# Patient Record
Sex: MALE | Race: ASIAN | Employment: FULL TIME | URBAN - METROPOLITAN AREA
[De-identification: names, ages, dates, MRNs, and addresses within clinical notes are randomized per-mention and may not be internally consistent; named-entity substitution may affect disease eponyms.]

---

## 2021-02-25 PROBLEM — E11.65 UNCONTROLLED TYPE 2 DIABETES MELLITUS WITH HYPERGLYCEMIA (HCC): Status: ACTIVE | Noted: 2021-02-25

## 2021-02-25 PROBLEM — R03.0 ELEVATED BP WITHOUT DIAGNOSIS OF HYPERTENSION: Status: ACTIVE | Noted: 2021-02-25

## 2021-02-25 PROBLEM — R79.89 LOW TESTOSTERONE IN MALE: Status: ACTIVE | Noted: 2021-02-25

## 2021-02-25 PROBLEM — R06.83 SNORING: Status: ACTIVE | Noted: 2021-02-25

## 2021-02-25 PROBLEM — E66.01 OBESITY, MORBID (HCC): Status: ACTIVE | Noted: 2021-02-25

## 2021-04-08 PROBLEM — E78.2 MIXED HYPERLIPIDEMIA DUE TO TYPE 2 DIABETES MELLITUS  (HCC): Status: ACTIVE | Noted: 2021-04-08

## 2021-04-08 PROBLEM — E11.69 MIXED HYPERLIPIDEMIA DUE TO TYPE 2 DIABETES MELLITUS  (HCC): Status: ACTIVE | Noted: 2021-04-08

## 2021-04-08 PROBLEM — D72.829 LEUKOCYTOSIS: Status: ACTIVE | Noted: 2021-04-08

## 2021-04-08 PROBLEM — E78.2 MIXED HYPERLIPIDEMIA DUE TO TYPE 2 DIABETES MELLITUS: Status: ACTIVE | Noted: 2021-04-08

## 2021-04-08 PROBLEM — E11.69 MIXED HYPERLIPIDEMIA DUE TO TYPE 2 DIABETES MELLITUS: Status: ACTIVE | Noted: 2021-04-08

## 2021-04-08 PROBLEM — R79.89 ABNORMAL LFTS: Status: ACTIVE | Noted: 2021-04-08

## 2021-04-08 PROBLEM — I10 ESSENTIAL HYPERTENSION: Status: ACTIVE | Noted: 2021-04-08

## 2021-04-08 PROBLEM — E11.69 MIXED HYPERLIPIDEMIA DUE TO TYPE 2 DIABETES MELLITUS (HCC): Status: ACTIVE | Noted: 2021-04-08

## 2021-04-08 PROBLEM — E78.2 MIXED HYPERLIPIDEMIA DUE TO TYPE 2 DIABETES MELLITUS (HCC): Status: ACTIVE | Noted: 2021-04-08

## 2021-11-09 PROBLEM — F10.20 ALCOHOLIC (HCC): Status: ACTIVE | Noted: 2021-11-09

## 2022-08-04 ENCOUNTER — OFFICE VISIT (OUTPATIENT)
Dept: FAMILY MEDICINE CLINIC | Facility: CLINIC | Age: 32
End: 2022-08-04
Payer: COMMERCIAL

## 2022-08-04 VITALS
HEART RATE: 97 BPM | TEMPERATURE: 98 F | WEIGHT: 261.38 LBS | DIASTOLIC BLOOD PRESSURE: 114 MMHG | HEIGHT: 67.91 IN | SYSTOLIC BLOOD PRESSURE: 144 MMHG | BODY MASS INDEX: 40.08 KG/M2 | OXYGEN SATURATION: 99 %

## 2022-08-04 DIAGNOSIS — E78.2 MIXED HYPERLIPIDEMIA DUE TO TYPE 2 DIABETES MELLITUS (HCC): ICD-10-CM

## 2022-08-04 DIAGNOSIS — Z13.29 SCREENING FOR THYROID DISORDER: ICD-10-CM

## 2022-08-04 DIAGNOSIS — Z11.59 NEED FOR HEPATITIS C SCREENING TEST: ICD-10-CM

## 2022-08-04 DIAGNOSIS — Z13.0 SCREENING, ANEMIA, DEFICIENCY, IRON: ICD-10-CM

## 2022-08-04 DIAGNOSIS — Z00.00 WELLNESS EXAMINATION: Primary | ICD-10-CM

## 2022-08-04 DIAGNOSIS — E11.69 MIXED HYPERLIPIDEMIA DUE TO TYPE 2 DIABETES MELLITUS (HCC): ICD-10-CM

## 2022-08-04 DIAGNOSIS — E11.65 UNCONTROLLED TYPE 2 DIABETES MELLITUS WITH HYPERGLYCEMIA (HCC): ICD-10-CM

## 2022-08-04 DIAGNOSIS — R80.9 MICROALBUMINURIA: ICD-10-CM

## 2022-08-04 DIAGNOSIS — I10 ESSENTIAL HYPERTENSION: ICD-10-CM

## 2022-08-04 DIAGNOSIS — R79.89 ABNORMAL LFTS: ICD-10-CM

## 2022-08-04 LAB
CARTRIDGE EXPIRATION DATE: ABNORMAL DATE
CARTRIDGE LOT#: 978 NUMERIC
CREAT UR-SCNC: 177 MG/DL
HEMOGLOBIN A1C: 9.2 % (ref 4.3–5.6)
MICROALBUMIN UR-MCNC: 1.03 MG/DL
MICROALBUMIN/CREAT 24H UR-RTO: 5.8 UG/MG (ref ?–30)

## 2022-08-04 PROCEDURE — 3077F SYST BP >= 140 MM HG: CPT | Performed by: FAMILY MEDICINE

## 2022-08-04 PROCEDURE — 83036 HEMOGLOBIN GLYCOSYLATED A1C: CPT | Performed by: FAMILY MEDICINE

## 2022-08-04 PROCEDURE — 82043 UR ALBUMIN QUANTITATIVE: CPT | Performed by: FAMILY MEDICINE

## 2022-08-04 PROCEDURE — 82570 ASSAY OF URINE CREATININE: CPT | Performed by: FAMILY MEDICINE

## 2022-08-04 PROCEDURE — 3008F BODY MASS INDEX DOCD: CPT | Performed by: FAMILY MEDICINE

## 2022-08-04 PROCEDURE — 99204 OFFICE O/P NEW MOD 45 MIN: CPT | Performed by: FAMILY MEDICINE

## 2022-08-04 PROCEDURE — 3061F NEG MICROALBUMINURIA REV: CPT | Performed by: FAMILY MEDICINE

## 2022-08-04 PROCEDURE — 99385 PREV VISIT NEW AGE 18-39: CPT | Performed by: FAMILY MEDICINE

## 2022-08-04 PROCEDURE — 3046F HEMOGLOBIN A1C LEVEL >9.0%: CPT | Performed by: FAMILY MEDICINE

## 2022-08-04 PROCEDURE — 3080F DIAST BP >= 90 MM HG: CPT | Performed by: FAMILY MEDICINE

## 2022-08-04 RX ORDER — LOSARTAN POTASSIUM 50 MG/1
50 TABLET ORAL DAILY
Qty: 90 TABLET | Refills: 0 | Status: SHIPPED | OUTPATIENT
Start: 2022-08-04

## 2022-09-16 ENCOUNTER — TELEPHONE (OUTPATIENT)
Dept: FAMILY MEDICINE CLINIC | Facility: CLINIC | Age: 32
End: 2022-09-16

## 2022-09-16 DIAGNOSIS — Z20.2 STD EXPOSURE: Primary | ICD-10-CM

## 2022-09-16 DIAGNOSIS — Z11.3 SCREEN FOR STD (SEXUALLY TRANSMITTED DISEASE): ICD-10-CM

## 2022-09-16 NOTE — TELEPHONE ENCOUNTER
Please let the patient know I have ordered the STD panel which checks for syphilis, gonorrhea, chlamydia, HIV and Hepatitis A, B and C. He should be check for all these if there was a possible STD exposure.

## 2022-09-17 ENCOUNTER — LAB ENCOUNTER (OUTPATIENT)
Dept: LAB | Age: 32
End: 2022-09-17
Attending: FAMILY MEDICINE
Payer: COMMERCIAL

## 2022-09-17 DIAGNOSIS — Z20.2 STD EXPOSURE: ICD-10-CM

## 2022-09-17 DIAGNOSIS — Z11.3 SCREEN FOR STD (SEXUALLY TRANSMITTED DISEASE): ICD-10-CM

## 2022-09-17 LAB — T PALLIDUM AB SER QL IA: REACTIVE

## 2022-09-17 PROCEDURE — 86780 TREPONEMA PALLIDUM: CPT

## 2022-09-17 PROCEDURE — 86593 SYPHILIS TEST NON-TREP QUANT: CPT

## 2022-09-17 PROCEDURE — 86592 SYPHILIS TEST NON-TREP QUAL: CPT

## 2022-09-17 PROCEDURE — 36415 COLL VENOUS BLD VENIPUNCTURE: CPT

## 2022-09-18 LAB
RPR SER QL: REACTIVE
RPR SER-TITR: 16 {TITER}

## 2022-09-19 ENCOUNTER — TELEPHONE (OUTPATIENT)
Dept: FAMILY MEDICINE CLINIC | Facility: CLINIC | Age: 32
End: 2022-09-19

## 2022-09-20 ENCOUNTER — PATIENT MESSAGE (OUTPATIENT)
Dept: FAMILY MEDICINE CLINIC | Facility: CLINIC | Age: 32
End: 2022-09-20

## 2022-09-21 NOTE — TELEPHONE ENCOUNTER
From: Petar Ellis  To: Gumaro Monahan DO  Sent: 9/20/2022 12:32 PM CDT  Subject: Syphillis    Hi, I was wondering what treatment will it be for my positive test result of syphillis. And where do I have to get it done? And I did make a video appointment with you for this Thursday at 11:30am to further discuss anything you wanted to ask or tell me.   Thank you,  Petar Vera

## 2022-09-22 ENCOUNTER — TELEMEDICINE (OUTPATIENT)
Dept: FAMILY MEDICINE CLINIC | Facility: CLINIC | Age: 32
End: 2022-09-22

## 2022-09-22 ENCOUNTER — LAB ENCOUNTER (OUTPATIENT)
Dept: LAB | Age: 32
End: 2022-09-22
Attending: FAMILY MEDICINE

## 2022-09-22 VITALS — HEIGHT: 67.91 IN | WEIGHT: 264 LBS | BODY MASS INDEX: 40.48 KG/M2

## 2022-09-22 DIAGNOSIS — A53.0 LATENT SYPHILIS IN MALE: Primary | ICD-10-CM

## 2022-09-22 DIAGNOSIS — I10 ESSENTIAL HYPERTENSION: ICD-10-CM

## 2022-09-22 DIAGNOSIS — Z13.29 SCREENING FOR THYROID DISORDER: ICD-10-CM

## 2022-09-22 DIAGNOSIS — Z20.2 STD EXPOSURE: ICD-10-CM

## 2022-09-22 DIAGNOSIS — I10 PRIMARY HYPERTENSION: ICD-10-CM

## 2022-09-22 DIAGNOSIS — E11.65 UNCONTROLLED TYPE 2 DIABETES MELLITUS WITH HYPERGLYCEMIA (HCC): ICD-10-CM

## 2022-09-22 DIAGNOSIS — Z13.0 SCREENING, ANEMIA, DEFICIENCY, IRON: ICD-10-CM

## 2022-09-22 DIAGNOSIS — Z11.59 NEED FOR HEPATITIS C SCREENING TEST: ICD-10-CM

## 2022-09-22 DIAGNOSIS — Z11.3 SCREEN FOR STD (SEXUALLY TRANSMITTED DISEASE): ICD-10-CM

## 2022-09-22 LAB
BASOPHILS # BLD AUTO: 0.07 X10(3) UL (ref 0–0.2)
BASOPHILS NFR BLD AUTO: 0.7 %
EOSINOPHIL # BLD AUTO: 0.34 X10(3) UL (ref 0–0.7)
EOSINOPHIL NFR BLD AUTO: 3.2 %
ERYTHROCYTE [DISTWIDTH] IN BLOOD BY AUTOMATED COUNT: 15.9 %
HAV IGM SER QL: NONREACTIVE
HBV CORE IGM SER QL: NONREACTIVE
HBV SURFACE AG SERPL QL IA: NONREACTIVE
HCT VFR BLD AUTO: 43.8 %
HCV AB SERPL QL IA: NONREACTIVE
HGB BLD-MCNC: 13.7 G/DL
IMM GRANULOCYTES # BLD AUTO: 0.03 X10(3) UL (ref 0–1)
IMM GRANULOCYTES NFR BLD: 0.3 %
LYMPHOCYTES # BLD AUTO: 3.14 X10(3) UL (ref 1–4)
LYMPHOCYTES NFR BLD AUTO: 29.9 %
MCH RBC QN AUTO: 28.5 PG (ref 26–34)
MCHC RBC AUTO-ENTMCNC: 31.3 G/DL (ref 31–37)
MCV RBC AUTO: 91.1 FL
MONOCYTES # BLD AUTO: 0.75 X10(3) UL (ref 0.1–1)
MONOCYTES NFR BLD AUTO: 7.1 %
NEUTROPHILS # BLD AUTO: 6.18 X10 (3) UL (ref 1.5–7.7)
NEUTROPHILS # BLD AUTO: 6.18 X10(3) UL (ref 1.5–7.7)
NEUTROPHILS NFR BLD AUTO: 58.8 %
PLATELET # BLD AUTO: 217 10(3)UL (ref 150–450)
RBC # BLD AUTO: 4.81 X10(6)UL
TSI SER-ACNC: 1.63 MIU/ML (ref 0.36–3.74)
WBC # BLD AUTO: 10.5 X10(3) UL (ref 4–11)

## 2022-09-22 PROCEDURE — 87491 CHLMYD TRACH DNA AMP PROBE: CPT

## 2022-09-22 PROCEDURE — 80074 ACUTE HEPATITIS PANEL: CPT

## 2022-09-22 PROCEDURE — 36415 COLL VENOUS BLD VENIPUNCTURE: CPT

## 2022-09-22 PROCEDURE — 80053 COMPREHEN METABOLIC PANEL: CPT

## 2022-09-22 PROCEDURE — 87591 N.GONORRHOEAE DNA AMP PROB: CPT

## 2022-09-22 PROCEDURE — 84443 ASSAY THYROID STIM HORMONE: CPT

## 2022-09-22 PROCEDURE — 85025 COMPLETE CBC W/AUTO DIFF WBC: CPT

## 2022-09-22 PROCEDURE — 99443 PHONE E/M BY PHYS 21-30 MIN: CPT | Performed by: FAMILY MEDICINE

## 2022-09-22 PROCEDURE — 87389 HIV-1 AG W/HIV-1&-2 AB AG IA: CPT

## 2022-09-22 PROCEDURE — 80061 LIPID PANEL: CPT

## 2022-09-23 DIAGNOSIS — I10 PRIMARY HYPERTENSION: Primary | ICD-10-CM

## 2022-09-23 LAB
ALBUMIN SERPL-MCNC: 3.8 G/DL (ref 3.4–5)
ALBUMIN/GLOB SERPL: 0.8 {RATIO} (ref 1–2)
ALP LIVER SERPL-CCNC: 121 U/L
ALT SERPL-CCNC: 81 U/L
ANION GAP SERPL CALC-SCNC: 17 MMOL/L (ref 0–18)
AST SERPL-CCNC: 55 U/L (ref 15–37)
BILIRUB SERPL-MCNC: 0.6 MG/DL (ref 0.1–2)
BUN BLD-MCNC: 7 MG/DL (ref 7–18)
C TRACH DNA SPEC QL NAA+PROBE: NEGATIVE
CALCIUM BLD-MCNC: 9.3 MG/DL (ref 8.5–10.1)
CHLORIDE SERPL-SCNC: 100 MMOL/L (ref 98–112)
CHOLEST SERPL-MCNC: 200 MG/DL (ref ?–200)
CO2 SERPL-SCNC: 20 MMOL/L (ref 21–32)
CREAT BLD-MCNC: 0.8 MG/DL
GFR SERPLBLD BASED ON 1.73 SQ M-ARVRAT: 122 ML/MIN/1.73M2 (ref 60–?)
GLOBULIN PLAS-MCNC: 4.8 G/DL (ref 2.8–4.4)
GLUCOSE BLD-MCNC: 224 MG/DL (ref 70–99)
HDLC SERPL-MCNC: 44 MG/DL (ref 40–59)
LDLC SERPL CALC-MCNC: 110 MG/DL (ref ?–100)
N GONORRHOEA DNA SPEC QL NAA+PROBE: NEGATIVE
NONHDLC SERPL-MCNC: 156 MG/DL (ref ?–130)
OSMOLALITY SERPL CALC.SUM OF ELEC: 289 MOSM/KG (ref 275–295)
POTASSIUM SERPL-SCNC: 3.5 MMOL/L (ref 3.5–5.1)
PROT SERPL-MCNC: 8.6 G/DL (ref 6.4–8.2)
SODIUM SERPL-SCNC: 137 MMOL/L (ref 136–145)
TRIGL SERPL-MCNC: 269 MG/DL (ref 30–149)
VLDLC SERPL CALC-MCNC: 47 MG/DL (ref 0–30)

## 2022-10-31 ENCOUNTER — LAB ENCOUNTER (OUTPATIENT)
Dept: LAB | Age: 32
End: 2022-10-31
Attending: FAMILY MEDICINE
Payer: COMMERCIAL

## 2022-10-31 DIAGNOSIS — E11.65 UNCONTROLLED TYPE 2 DIABETES MELLITUS WITH HYPERGLYCEMIA (HCC): ICD-10-CM

## 2022-10-31 DIAGNOSIS — E78.2 MIXED HYPERLIPIDEMIA DUE TO TYPE 2 DIABETES MELLITUS (HCC): ICD-10-CM

## 2022-10-31 DIAGNOSIS — E11.69 MIXED HYPERLIPIDEMIA DUE TO TYPE 2 DIABETES MELLITUS (HCC): ICD-10-CM

## 2022-10-31 LAB
ALBUMIN SERPL-MCNC: 3.9 G/DL (ref 3.4–5)
ALBUMIN/GLOB SERPL: 0.8 {RATIO} (ref 1–2)
ALP LIVER SERPL-CCNC: 110 U/L
ALT SERPL-CCNC: 130 U/L
ANION GAP SERPL CALC-SCNC: 8 MMOL/L (ref 0–18)
AST SERPL-CCNC: 109 U/L (ref 15–37)
BILIRUB SERPL-MCNC: 1.1 MG/DL (ref 0.1–2)
BUN BLD-MCNC: 8 MG/DL (ref 7–18)
CALCIUM BLD-MCNC: 9.6 MG/DL (ref 8.5–10.1)
CHLORIDE SERPL-SCNC: 101 MMOL/L (ref 98–112)
CHOLEST SERPL-MCNC: 219 MG/DL (ref ?–200)
CO2 SERPL-SCNC: 27 MMOL/L (ref 21–32)
CREAT BLD-MCNC: 0.81 MG/DL
FASTING PATIENT LIPID ANSWER: YES
FASTING STATUS PATIENT QL REPORTED: YES
GFR SERPLBLD BASED ON 1.73 SQ M-ARVRAT: 122 ML/MIN/1.73M2 (ref 60–?)
GLOBULIN PLAS-MCNC: 4.7 G/DL (ref 2.8–4.4)
GLUCOSE BLD-MCNC: 164 MG/DL (ref 70–99)
HDLC SERPL-MCNC: 47 MG/DL (ref 40–59)
LDLC SERPL CALC-MCNC: 129 MG/DL (ref ?–100)
NONHDLC SERPL-MCNC: 172 MG/DL (ref ?–130)
OSMOLALITY SERPL CALC.SUM OF ELEC: 284 MOSM/KG (ref 275–295)
POTASSIUM SERPL-SCNC: 4 MMOL/L (ref 3.5–5.1)
PROT SERPL-MCNC: 8.6 G/DL (ref 6.4–8.2)
SODIUM SERPL-SCNC: 136 MMOL/L (ref 136–145)
TRIGL SERPL-MCNC: 240 MG/DL (ref 30–149)
VLDLC SERPL CALC-MCNC: 44 MG/DL (ref 0–30)

## 2022-10-31 PROCEDURE — 80053 COMPREHEN METABOLIC PANEL: CPT

## 2022-10-31 PROCEDURE — 80061 LIPID PANEL: CPT

## 2022-10-31 PROCEDURE — 36415 COLL VENOUS BLD VENIPUNCTURE: CPT

## 2022-11-02 ENCOUNTER — OFFICE VISIT (OUTPATIENT)
Dept: FAMILY MEDICINE CLINIC | Facility: CLINIC | Age: 32
End: 2022-11-02
Payer: COMMERCIAL

## 2022-11-02 VITALS
BODY MASS INDEX: 41.25 KG/M2 | WEIGHT: 269 LBS | DIASTOLIC BLOOD PRESSURE: 82 MMHG | OXYGEN SATURATION: 98 % | HEART RATE: 93 BPM | HEIGHT: 67.91 IN | SYSTOLIC BLOOD PRESSURE: 138 MMHG | TEMPERATURE: 97 F | RESPIRATION RATE: 16 BRPM

## 2022-11-02 DIAGNOSIS — R16.0 HEPATOMEGALY: ICD-10-CM

## 2022-11-02 DIAGNOSIS — I10 ESSENTIAL HYPERTENSION: ICD-10-CM

## 2022-11-02 DIAGNOSIS — R80.9 MICROALBUMINURIA: ICD-10-CM

## 2022-11-02 DIAGNOSIS — E11.69 MIXED HYPERLIPIDEMIA DUE TO TYPE 2 DIABETES MELLITUS (HCC): ICD-10-CM

## 2022-11-02 DIAGNOSIS — A53.0 POSITIVE RPR TEST: ICD-10-CM

## 2022-11-02 DIAGNOSIS — E11.65 UNCONTROLLED TYPE 2 DIABETES MELLITUS WITH HYPERGLYCEMIA (HCC): Primary | ICD-10-CM

## 2022-11-02 DIAGNOSIS — E78.2 MIXED HYPERLIPIDEMIA DUE TO TYPE 2 DIABETES MELLITUS (HCC): ICD-10-CM

## 2022-11-02 DIAGNOSIS — R79.89 ELEVATED LFTS: ICD-10-CM

## 2022-11-02 DIAGNOSIS — Z23 NEED FOR INFLUENZA VACCINATION: ICD-10-CM

## 2022-11-02 LAB
CARTRIDGE LOT#: ABNORMAL NUMERIC
HEMOGLOBIN A1C: 7.3 % (ref 4.3–5.6)

## 2022-11-02 PROCEDURE — 90686 IIV4 VACC NO PRSV 0.5 ML IM: CPT | Performed by: FAMILY MEDICINE

## 2022-11-02 PROCEDURE — 3008F BODY MASS INDEX DOCD: CPT | Performed by: FAMILY MEDICINE

## 2022-11-02 PROCEDURE — 90471 IMMUNIZATION ADMIN: CPT | Performed by: FAMILY MEDICINE

## 2022-11-02 PROCEDURE — 83036 HEMOGLOBIN GLYCOSYLATED A1C: CPT | Performed by: FAMILY MEDICINE

## 2022-11-02 PROCEDURE — 3051F HG A1C>EQUAL 7.0%<8.0%: CPT | Performed by: FAMILY MEDICINE

## 2022-11-02 PROCEDURE — 3079F DIAST BP 80-89 MM HG: CPT | Performed by: FAMILY MEDICINE

## 2022-11-02 PROCEDURE — 99214 OFFICE O/P EST MOD 30 MIN: CPT | Performed by: FAMILY MEDICINE

## 2022-11-02 PROCEDURE — 3075F SYST BP GE 130 - 139MM HG: CPT | Performed by: FAMILY MEDICINE

## 2022-11-02 RX ORDER — ATORVASTATIN CALCIUM 20 MG/1
20 TABLET, FILM COATED ORAL NIGHTLY
Qty: 90 TABLET | Refills: 0 | Status: SHIPPED | OUTPATIENT
Start: 2022-11-02

## 2022-11-02 RX ORDER — LOSARTAN POTASSIUM 50 MG/1
50 TABLET ORAL DAILY
Qty: 90 TABLET | Refills: 0 | Status: SHIPPED | OUTPATIENT
Start: 2022-11-02

## 2022-11-02 NOTE — PATIENT INSTRUCTIONS
Start Atorvastatin 20 mg one tablet nightly for your cholesterol. Continue your metformin and Losartan as prescribed. Make an appointment with the gastroenterologist, Dr. Gilford Ferrier or one of his partners for further evaluation of your elevated liver function tests. Make an appointment with the eye doctor, Dr. Farhana Novoa, for your diabetic eye exam.    Increase your exercise and watch your diet. Limit fatty foods and carbohydrates. You received the Flu vaccines today. You may run a low grade fever, have mild redness or swelling at the site of the shot, muscle pain at the site of the shot for the next 2-3 days. You may take Tylenol or Ibuprofen as needed. Use your arm to help decrease pain and swelling. You can apply ice to any swelling for 10-15 minutes twice daily through clothing or a towel. See me in 3 months.

## 2022-11-28 DIAGNOSIS — E11.65 UNCONTROLLED TYPE 2 DIABETES MELLITUS WITH HYPERGLYCEMIA (HCC): ICD-10-CM

## 2022-11-28 DIAGNOSIS — R80.9 MICROALBUMINURIA: ICD-10-CM

## 2022-11-28 DIAGNOSIS — I10 ESSENTIAL HYPERTENSION: ICD-10-CM

## 2022-11-29 RX ORDER — LOSARTAN POTASSIUM 50 MG/1
50 TABLET ORAL DAILY
Qty: 90 TABLET | Refills: 0 | OUTPATIENT
Start: 2022-11-29

## 2023-01-02 DIAGNOSIS — E78.2 MIXED HYPERLIPIDEMIA DUE TO TYPE 2 DIABETES MELLITUS (HCC): ICD-10-CM

## 2023-01-02 DIAGNOSIS — E11.69 MIXED HYPERLIPIDEMIA DUE TO TYPE 2 DIABETES MELLITUS (HCC): ICD-10-CM

## 2023-01-03 RX ORDER — ATORVASTATIN CALCIUM 20 MG/1
TABLET, FILM COATED ORAL
Qty: 90 TABLET | Refills: 0 | Status: SHIPPED | OUTPATIENT
Start: 2023-01-03

## 2023-02-02 ENCOUNTER — OFFICE VISIT (OUTPATIENT)
Dept: FAMILY MEDICINE CLINIC | Facility: CLINIC | Age: 33
End: 2023-02-02
Payer: COMMERCIAL

## 2023-02-02 VITALS
WEIGHT: 271 LBS | HEART RATE: 89 BPM | SYSTOLIC BLOOD PRESSURE: 132 MMHG | RESPIRATION RATE: 18 BRPM | OXYGEN SATURATION: 100 % | TEMPERATURE: 98 F | BODY MASS INDEX: 42.53 KG/M2 | HEIGHT: 67 IN | DIASTOLIC BLOOD PRESSURE: 78 MMHG

## 2023-02-02 DIAGNOSIS — E66.01 OBESITY, MORBID (HCC): ICD-10-CM

## 2023-02-02 DIAGNOSIS — E78.2 MIXED HYPERLIPIDEMIA DUE TO TYPE 2 DIABETES MELLITUS (HCC): ICD-10-CM

## 2023-02-02 DIAGNOSIS — I10 ESSENTIAL HYPERTENSION: ICD-10-CM

## 2023-02-02 DIAGNOSIS — R80.9 MICROALBUMINURIA: ICD-10-CM

## 2023-02-02 DIAGNOSIS — E11.69 MIXED HYPERLIPIDEMIA DUE TO TYPE 2 DIABETES MELLITUS (HCC): ICD-10-CM

## 2023-02-02 DIAGNOSIS — Z23 NEED FOR VACCINATION AGAINST STREPTOCOCCUS PNEUMONIAE: ICD-10-CM

## 2023-02-02 DIAGNOSIS — E11.65 UNCONTROLLED TYPE 2 DIABETES MELLITUS WITH HYPERGLYCEMIA (HCC): Primary | ICD-10-CM

## 2023-02-02 DIAGNOSIS — R79.89 ABNORMAL LFTS: ICD-10-CM

## 2023-02-02 LAB
ALBUMIN SERPL-MCNC: 3.8 G/DL (ref 3.4–5)
ALBUMIN/GLOB SERPL: 0.8 {RATIO} (ref 1–2)
ALP LIVER SERPL-CCNC: 116 U/L
ALT SERPL-CCNC: 41 U/L
ANION GAP SERPL CALC-SCNC: 4 MMOL/L (ref 0–18)
AST SERPL-CCNC: 26 U/L (ref 15–37)
BILIRUB SERPL-MCNC: 0.8 MG/DL (ref 0.1–2)
BUN BLD-MCNC: 10 MG/DL (ref 7–18)
CALCIUM BLD-MCNC: 9.4 MG/DL (ref 8.5–10.1)
CHLORIDE SERPL-SCNC: 103 MMOL/L (ref 98–112)
CHOLEST SERPL-MCNC: 114 MG/DL (ref ?–200)
CO2 SERPL-SCNC: 31 MMOL/L (ref 21–32)
CREAT BLD-MCNC: 0.73 MG/DL
EST. AVERAGE GLUCOSE BLD GHB EST-MCNC: 217 MG/DL (ref 68–126)
FASTING PATIENT LIPID ANSWER: YES
FASTING STATUS PATIENT QL REPORTED: YES
GFR SERPLBLD BASED ON 1.73 SQ M-ARVRAT: 126 ML/MIN/1.73M2 (ref 60–?)
GLOBULIN PLAS-MCNC: 4.5 G/DL (ref 2.8–4.4)
GLUCOSE BLD-MCNC: 197 MG/DL (ref 70–99)
HBA1C MFR BLD: 9.2 % (ref ?–5.7)
HDLC SERPL-MCNC: 42 MG/DL (ref 40–59)
LDLC SERPL CALC-MCNC: 54 MG/DL (ref ?–100)
NONHDLC SERPL-MCNC: 72 MG/DL (ref ?–130)
OSMOLALITY SERPL CALC.SUM OF ELEC: 291 MOSM/KG (ref 275–295)
POTASSIUM SERPL-SCNC: 4.3 MMOL/L (ref 3.5–5.1)
PROT SERPL-MCNC: 8.3 G/DL (ref 6.4–8.2)
SODIUM SERPL-SCNC: 138 MMOL/L (ref 136–145)
TRIGL SERPL-MCNC: 97 MG/DL (ref 30–149)
VLDLC SERPL CALC-MCNC: 14 MG/DL (ref 0–30)

## 2023-02-02 PROCEDURE — 90471 IMMUNIZATION ADMIN: CPT | Performed by: FAMILY MEDICINE

## 2023-02-02 PROCEDURE — 80053 COMPREHEN METABOLIC PANEL: CPT | Performed by: FAMILY MEDICINE

## 2023-02-02 PROCEDURE — 99215 OFFICE O/P EST HI 40 MIN: CPT | Performed by: FAMILY MEDICINE

## 2023-02-02 PROCEDURE — 83036 HEMOGLOBIN GLYCOSYLATED A1C: CPT | Performed by: FAMILY MEDICINE

## 2023-02-02 PROCEDURE — 3046F HEMOGLOBIN A1C LEVEL >9.0%: CPT | Performed by: FAMILY MEDICINE

## 2023-02-02 PROCEDURE — 90677 PCV20 VACCINE IM: CPT | Performed by: FAMILY MEDICINE

## 2023-02-02 PROCEDURE — 3078F DIAST BP <80 MM HG: CPT | Performed by: FAMILY MEDICINE

## 2023-02-02 PROCEDURE — 3075F SYST BP GE 130 - 139MM HG: CPT | Performed by: FAMILY MEDICINE

## 2023-02-02 PROCEDURE — 80061 LIPID PANEL: CPT | Performed by: FAMILY MEDICINE

## 2023-02-02 PROCEDURE — 3008F BODY MASS INDEX DOCD: CPT | Performed by: FAMILY MEDICINE

## 2023-02-02 RX ORDER — LOSARTAN POTASSIUM 50 MG/1
50 TABLET ORAL DAILY
Qty: 90 TABLET | Refills: 0 | Status: SHIPPED | OUTPATIENT
Start: 2023-02-02

## 2023-02-03 DIAGNOSIS — E11.65 UNCONTROLLED TYPE 2 DIABETES MELLITUS WITH HYPERGLYCEMIA (HCC): Primary | ICD-10-CM

## 2023-02-10 ENCOUNTER — TELEPHONE (OUTPATIENT)
Dept: FAMILY MEDICINE CLINIC | Facility: CLINIC | Age: 33
End: 2023-02-10

## 2023-02-10 NOTE — TELEPHONE ENCOUNTER
Cornel calling from the [facility] Department requesting to speak with Dr. Chritsianne Quintana nurse regarding DOS 9/17/22.     Cornel's call back number is [phone]

## 2023-03-07 DIAGNOSIS — R80.9 MICROALBUMINURIA: ICD-10-CM

## 2023-03-07 DIAGNOSIS — I10 ESSENTIAL HYPERTENSION: ICD-10-CM

## 2023-03-07 DIAGNOSIS — E11.65 UNCONTROLLED TYPE 2 DIABETES MELLITUS WITH HYPERGLYCEMIA (HCC): ICD-10-CM

## 2023-03-07 RX ORDER — LOSARTAN POTASSIUM 50 MG/1
50 TABLET ORAL DAILY
Qty: 90 TABLET | Refills: 0 | Status: SHIPPED | OUTPATIENT
Start: 2023-03-07

## 2023-06-09 DIAGNOSIS — I10 ESSENTIAL HYPERTENSION: ICD-10-CM

## 2023-06-09 DIAGNOSIS — E11.65 UNCONTROLLED TYPE 2 DIABETES MELLITUS WITH HYPERGLYCEMIA (HCC): ICD-10-CM

## 2023-06-09 DIAGNOSIS — R80.9 MICROALBUMINURIA: ICD-10-CM

## 2023-06-09 DIAGNOSIS — E78.2 MIXED HYPERLIPIDEMIA DUE TO TYPE 2 DIABETES MELLITUS (HCC): ICD-10-CM

## 2023-06-09 DIAGNOSIS — E11.69 MIXED HYPERLIPIDEMIA DUE TO TYPE 2 DIABETES MELLITUS (HCC): ICD-10-CM

## 2023-06-09 RX ORDER — LOSARTAN POTASSIUM 50 MG/1
50 TABLET ORAL DAILY
Qty: 30 TABLET | Refills: 0 | Status: SHIPPED | OUTPATIENT
Start: 2023-06-09

## 2023-06-09 RX ORDER — ATORVASTATIN CALCIUM 20 MG/1
20 TABLET, FILM COATED ORAL NIGHTLY
Qty: 30 TABLET | Refills: 0 | Status: SHIPPED | OUTPATIENT
Start: 2023-06-09

## 2023-07-11 DIAGNOSIS — E78.2 MIXED HYPERLIPIDEMIA DUE TO TYPE 2 DIABETES MELLITUS: ICD-10-CM

## 2023-07-11 DIAGNOSIS — E11.65 UNCONTROLLED TYPE 2 DIABETES MELLITUS WITH HYPERGLYCEMIA (HCC): ICD-10-CM

## 2023-07-11 DIAGNOSIS — E11.69 MIXED HYPERLIPIDEMIA DUE TO TYPE 2 DIABETES MELLITUS: ICD-10-CM

## 2023-07-11 DIAGNOSIS — I10 ESSENTIAL HYPERTENSION: ICD-10-CM

## 2023-07-11 DIAGNOSIS — R80.9 MICROALBUMINURIA: ICD-10-CM

## 2023-07-12 RX ORDER — LOSARTAN POTASSIUM 50 MG/1
50 TABLET ORAL DAILY
Qty: 30 TABLET | Refills: 0 | Status: SHIPPED | OUTPATIENT
Start: 2023-07-12

## 2023-07-12 RX ORDER — ATORVASTATIN CALCIUM 20 MG/1
20 TABLET, FILM COATED ORAL NIGHTLY
Qty: 30 TABLET | Refills: 0 | Status: SHIPPED | OUTPATIENT
Start: 2023-07-12

## 2023-08-10 ENCOUNTER — OFFICE VISIT (OUTPATIENT)
Dept: FAMILY MEDICINE CLINIC | Facility: CLINIC | Age: 33
End: 2023-08-10
Payer: COMMERCIAL

## 2023-08-10 VITALS
HEIGHT: 68.19 IN | TEMPERATURE: 98 F | SYSTOLIC BLOOD PRESSURE: 144 MMHG | BODY MASS INDEX: 42.49 KG/M2 | WEIGHT: 280.38 LBS | HEART RATE: 88 BPM | DIASTOLIC BLOOD PRESSURE: 100 MMHG | OXYGEN SATURATION: 97 %

## 2023-08-10 DIAGNOSIS — E66.01 OBESITY, MORBID (HCC): ICD-10-CM

## 2023-08-10 DIAGNOSIS — E11.69 MIXED HYPERLIPIDEMIA DUE TO TYPE 2 DIABETES MELLITUS: ICD-10-CM

## 2023-08-10 DIAGNOSIS — I10 ESSENTIAL HYPERTENSION: ICD-10-CM

## 2023-08-10 DIAGNOSIS — L60.0 INGROWN LEFT BIG TOENAIL: ICD-10-CM

## 2023-08-10 DIAGNOSIS — E78.2 MIXED HYPERLIPIDEMIA DUE TO TYPE 2 DIABETES MELLITUS: ICD-10-CM

## 2023-08-10 DIAGNOSIS — E11.65 UNCONTROLLED TYPE 2 DIABETES MELLITUS WITH HYPERGLYCEMIA (HCC): ICD-10-CM

## 2023-08-10 DIAGNOSIS — Z00.00 WELLNESS EXAMINATION: Primary | ICD-10-CM

## 2023-08-10 DIAGNOSIS — R80.9 MICROALBUMINURIA: ICD-10-CM

## 2023-08-10 LAB
CARTRIDGE EXPIRATION DATE: ABNORMAL DATE
CARTRIDGE LOT#: 603 NUMERIC
CREAT UR-SCNC: 175 MG/DL
HEMOGLOBIN A1C: 6.5 % (ref 4.3–5.6)
MICROALBUMIN UR-MCNC: 7.28 MG/DL
MICROALBUMIN/CREAT 24H UR-RTO: 41.6 UG/MG (ref ?–30)

## 2023-08-10 PROCEDURE — 99214 OFFICE O/P EST MOD 30 MIN: CPT | Performed by: FAMILY MEDICINE

## 2023-08-10 PROCEDURE — 3080F DIAST BP >= 90 MM HG: CPT | Performed by: FAMILY MEDICINE

## 2023-08-10 PROCEDURE — 3008F BODY MASS INDEX DOCD: CPT | Performed by: FAMILY MEDICINE

## 2023-08-10 PROCEDURE — 3077F SYST BP >= 140 MM HG: CPT | Performed by: FAMILY MEDICINE

## 2023-08-10 PROCEDURE — 3060F POS MICROALBUMINURIA REV: CPT | Performed by: FAMILY MEDICINE

## 2023-08-10 PROCEDURE — 83036 HEMOGLOBIN GLYCOSYLATED A1C: CPT | Performed by: FAMILY MEDICINE

## 2023-08-10 PROCEDURE — 99395 PREV VISIT EST AGE 18-39: CPT | Performed by: FAMILY MEDICINE

## 2023-08-10 PROCEDURE — 82570 ASSAY OF URINE CREATININE: CPT | Performed by: FAMILY MEDICINE

## 2023-08-10 PROCEDURE — 82043 UR ALBUMIN QUANTITATIVE: CPT | Performed by: FAMILY MEDICINE

## 2023-08-10 PROCEDURE — 3044F HG A1C LEVEL LT 7.0%: CPT | Performed by: FAMILY MEDICINE

## 2023-08-10 RX ORDER — GLIMEPIRIDE 2 MG/1
2 TABLET ORAL
Qty: 30 TABLET | Refills: 0 | Status: SHIPPED | OUTPATIENT
Start: 2023-08-10

## 2023-08-10 RX ORDER — ATORVASTATIN CALCIUM 20 MG/1
20 TABLET, FILM COATED ORAL NIGHTLY
Qty: 90 TABLET | Refills: 1 | Status: SHIPPED | OUTPATIENT
Start: 2023-08-10

## 2023-08-10 RX ORDER — LOSARTAN POTASSIUM 100 MG/1
100 TABLET ORAL DAILY
Qty: 30 TABLET | Refills: 0 | Status: SHIPPED | OUTPATIENT
Start: 2023-08-10

## 2023-09-11 DIAGNOSIS — E11.65 UNCONTROLLED TYPE 2 DIABETES MELLITUS WITH HYPERGLYCEMIA (HCC): ICD-10-CM

## 2023-09-11 RX ORDER — GLIMEPIRIDE 2 MG/1
2 TABLET ORAL
Qty: 30 TABLET | Refills: 0 | Status: SHIPPED | OUTPATIENT
Start: 2023-09-11

## 2023-09-17 DIAGNOSIS — E11.65 UNCONTROLLED TYPE 2 DIABETES MELLITUS WITH HYPERGLYCEMIA (HCC): ICD-10-CM

## 2023-09-17 DIAGNOSIS — E78.2 MIXED HYPERLIPIDEMIA DUE TO TYPE 2 DIABETES MELLITUS: ICD-10-CM

## 2023-09-17 DIAGNOSIS — I10 ESSENTIAL HYPERTENSION: ICD-10-CM

## 2023-09-17 DIAGNOSIS — E11.69 MIXED HYPERLIPIDEMIA DUE TO TYPE 2 DIABETES MELLITUS: ICD-10-CM

## 2023-09-17 DIAGNOSIS — R80.9 MICROALBUMINURIA: ICD-10-CM

## 2023-09-18 RX ORDER — LOSARTAN POTASSIUM 100 MG/1
100 TABLET ORAL DAILY
Qty: 30 TABLET | Refills: 0 | Status: SHIPPED | OUTPATIENT
Start: 2023-09-18

## 2023-09-18 RX ORDER — ATORVASTATIN CALCIUM 20 MG/1
20 TABLET, FILM COATED ORAL NIGHTLY
Qty: 90 TABLET | Refills: 1 | Status: SHIPPED | OUTPATIENT
Start: 2023-09-18

## 2023-10-13 DIAGNOSIS — E11.65 UNCONTROLLED TYPE 2 DIABETES MELLITUS WITH HYPERGLYCEMIA (HCC): ICD-10-CM

## 2023-10-13 RX ORDER — GLIMEPIRIDE 2 MG/1
2 TABLET ORAL
Qty: 30 TABLET | Refills: 0 | Status: SHIPPED | OUTPATIENT
Start: 2023-10-13

## 2023-10-15 DIAGNOSIS — I10 ESSENTIAL HYPERTENSION: ICD-10-CM

## 2023-10-15 DIAGNOSIS — E11.69 MIXED HYPERLIPIDEMIA DUE TO TYPE 2 DIABETES MELLITUS: ICD-10-CM

## 2023-10-15 DIAGNOSIS — R80.9 MICROALBUMINURIA: ICD-10-CM

## 2023-10-15 DIAGNOSIS — E78.2 MIXED HYPERLIPIDEMIA DUE TO TYPE 2 DIABETES MELLITUS: ICD-10-CM

## 2023-10-15 DIAGNOSIS — E11.65 UNCONTROLLED TYPE 2 DIABETES MELLITUS WITH HYPERGLYCEMIA (HCC): ICD-10-CM

## 2023-10-16 RX ORDER — LOSARTAN POTASSIUM 100 MG/1
100 TABLET ORAL DAILY
Qty: 90 TABLET | Refills: 0 | Status: SHIPPED | OUTPATIENT
Start: 2023-10-16

## 2023-10-16 RX ORDER — ATORVASTATIN CALCIUM 20 MG/1
20 TABLET, FILM COATED ORAL NIGHTLY
Qty: 90 TABLET | Refills: 1 | Status: SHIPPED | OUTPATIENT
Start: 2023-10-16

## 2023-10-16 NOTE — TELEPHONE ENCOUNTER
Recent Visits  Date Type Provider Dept   08/10/23 Office Visit Nneka Points, DO [facility] [address]     Future Appointments  No visits were found

## 2023-10-22 ENCOUNTER — PATIENT MESSAGE (OUTPATIENT)
Dept: FAMILY MEDICINE CLINIC | Facility: CLINIC | Age: 33
End: 2023-10-22

## 2023-10-23 NOTE — TELEPHONE ENCOUNTER
From: Petar Ellis  To: Ravi Stewart  Sent: 10/22/2023 10:21 AM CDT  Subject: Sleep apnea    Hi how are you? Can I please get a sleep apnea machine order? Looking forward to it.  Thank you!    -Petar Armendariz

## 2023-11-02 ENCOUNTER — TELEPHONE (OUTPATIENT)
Dept: FAMILY MEDICINE CLINIC | Facility: CLINIC | Age: 33
End: 2023-11-02

## 2023-11-02 NOTE — TELEPHONE ENCOUNTER
Attempted to contacted patient VM box was full. Per  patient needs to be seen in office due to HTN follow up and change in DM medication.    Azimo message also sent.

## 2023-11-11 DIAGNOSIS — E11.65 UNCONTROLLED TYPE 2 DIABETES MELLITUS WITH HYPERGLYCEMIA (HCC): ICD-10-CM

## 2023-11-14 RX ORDER — GLIMEPIRIDE 2 MG/1
2 TABLET ORAL
Qty: 30 TABLET | Refills: 0 | Status: SHIPPED | OUTPATIENT
Start: 2023-11-14

## 2023-11-14 NOTE — TELEPHONE ENCOUNTER
Requested Prescriptions     Pending Prescriptions Disp Refills    GLIMEPIRIDE 2 MG Oral Tab [Pharmacy Med Name: Glimepiride 2 Mg Tab ] 30 tablet 0     Sig: Take 1 tablet (2 mg total) by mouth daily with breakfast.     Last fill was 10/13/23 #30    Last OV 8/10/23   I will start him on glimepiride 2 mg 1 tablet daily. I would like him to check his blood sugar once daily for the next 4 weeks and bring those readings with him to reassess his diabetes. He should continue with the metformin at 1000 mg twice daily. The patient is asked to return in 4 weeks      FOV 11/16/23    #30 refill approved.

## 2023-11-16 ENCOUNTER — OFFICE VISIT (OUTPATIENT)
Dept: FAMILY MEDICINE CLINIC | Facility: CLINIC | Age: 33
End: 2023-11-16
Payer: COMMERCIAL

## 2023-11-16 VITALS
SYSTOLIC BLOOD PRESSURE: 130 MMHG | TEMPERATURE: 98 F | BODY MASS INDEX: 44.16 KG/M2 | HEART RATE: 89 BPM | HEIGHT: 68 IN | DIASTOLIC BLOOD PRESSURE: 100 MMHG | OXYGEN SATURATION: 92 % | WEIGHT: 291.38 LBS

## 2023-11-16 DIAGNOSIS — E66.01 OBESITY, MORBID (HCC): ICD-10-CM

## 2023-11-16 DIAGNOSIS — Z91.89 AT RISK FOR OBSTRUCTIVE SLEEP APNEA: ICD-10-CM

## 2023-11-16 DIAGNOSIS — R06.83 SNORING: ICD-10-CM

## 2023-11-16 DIAGNOSIS — Z28.21 IMMUNIZATION DECLINED: ICD-10-CM

## 2023-11-16 DIAGNOSIS — R06.81 APNEA: ICD-10-CM

## 2023-11-16 DIAGNOSIS — I10 ESSENTIAL HYPERTENSION: Primary | ICD-10-CM

## 2023-11-16 DIAGNOSIS — E11.9 TYPE 2 DIABETES MELLITUS WITHOUT COMPLICATION, WITHOUT LONG-TERM CURRENT USE OF INSULIN (HCC): ICD-10-CM

## 2023-12-14 ENCOUNTER — OFFICE VISIT (OUTPATIENT)
Dept: FAMILY MEDICINE CLINIC | Facility: CLINIC | Age: 33
End: 2023-12-14
Payer: COMMERCIAL

## 2023-12-14 ENCOUNTER — OFFICE VISIT (OUTPATIENT)
Dept: SLEEP CENTER | Age: 33
End: 2023-12-14
Attending: INTERNAL MEDICINE
Payer: COMMERCIAL

## 2023-12-14 VITALS
BODY MASS INDEX: 46.07 KG/M2 | TEMPERATURE: 98 F | RESPIRATION RATE: 18 BRPM | DIASTOLIC BLOOD PRESSURE: 74 MMHG | HEART RATE: 112 BPM | OXYGEN SATURATION: 96 % | HEIGHT: 68 IN | SYSTOLIC BLOOD PRESSURE: 138 MMHG | WEIGHT: 304 LBS

## 2023-12-14 DIAGNOSIS — R06.83 SNORING: ICD-10-CM

## 2023-12-14 DIAGNOSIS — E11.29 MICROALBUMINURIA DUE TO TYPE 2 DIABETES MELLITUS  (HCC): ICD-10-CM

## 2023-12-14 DIAGNOSIS — R06.81 APNEA: ICD-10-CM

## 2023-12-14 DIAGNOSIS — E78.2 MIXED HYPERLIPIDEMIA DUE TO TYPE 2 DIABETES MELLITUS  (HCC): ICD-10-CM

## 2023-12-14 DIAGNOSIS — E11.69 MIXED HYPERLIPIDEMIA DUE TO TYPE 2 DIABETES MELLITUS  (HCC): ICD-10-CM

## 2023-12-14 DIAGNOSIS — E66.01 OBESITY, MORBID (HCC): ICD-10-CM

## 2023-12-14 DIAGNOSIS — Z91.89 AT RISK FOR OBSTRUCTIVE SLEEP APNEA: ICD-10-CM

## 2023-12-14 DIAGNOSIS — I10 ESSENTIAL HYPERTENSION: Primary | ICD-10-CM

## 2023-12-14 DIAGNOSIS — R80.9 MICROALBUMINURIA DUE TO TYPE 2 DIABETES MELLITUS  (HCC): ICD-10-CM

## 2023-12-14 PROBLEM — F90.9 ATTENTION DEFICIT HYPERACTIVITY DISORDER (ADHD): Status: ACTIVE | Noted: 2018-02-15

## 2023-12-14 PROCEDURE — 99214 OFFICE O/P EST MOD 30 MIN: CPT | Performed by: FAMILY MEDICINE

## 2023-12-14 PROCEDURE — 3008F BODY MASS INDEX DOCD: CPT | Performed by: FAMILY MEDICINE

## 2023-12-14 PROCEDURE — 3075F SYST BP GE 130 - 139MM HG: CPT | Performed by: FAMILY MEDICINE

## 2023-12-14 PROCEDURE — 95811 POLYSOM 6/>YRS CPAP 4/> PARM: CPT

## 2023-12-14 PROCEDURE — 3078F DIAST BP <80 MM HG: CPT | Performed by: FAMILY MEDICINE

## 2023-12-14 RX ORDER — TIRZEPATIDE 2.5 MG/.5ML
2.5 INJECTION, SOLUTION SUBCUTANEOUS WEEKLY
Qty: 2 ML | Refills: 0 | Status: SHIPPED | OUTPATIENT
Start: 2023-12-14

## 2023-12-14 RX ORDER — DEXTROAMPHETAMINE SULFATE 10 MG/1
1 TABLET ORAL 3 TIMES DAILY
COMMUNITY

## 2023-12-14 NOTE — PATIENT INSTRUCTIONS
Start the Mounjaro 2.5 mg injected into the skin once weekly for 4 weeks. Rotate the injection sites on the abdominal wall. Be sure to drink 64 ounces of water daily. Take fiber gummies to help prevent constipation. Use a stool softener like Miralax or Colace if you develop constipation. Increase your exercise. Continue to monitor your diet. Keep your appointment for your sleep study. Keep your appointment for your diabetic eye exam.    See me in 4 weeks for recheck on your diabetes, weight and blood pressure.

## 2023-12-27 ENCOUNTER — SLEEP STUDY (OUTPATIENT)
Facility: CLINIC | Age: 33
End: 2023-12-27
Payer: COMMERCIAL

## 2023-12-27 DIAGNOSIS — G47.30 SLEEP APNEA, UNSPECIFIED TYPE: Primary | ICD-10-CM

## 2023-12-27 PROCEDURE — 95811 POLYSOM 6/>YRS CPAP 4/> PARM: CPT | Performed by: INTERNAL MEDICINE

## 2024-01-03 ENCOUNTER — MED REC SCAN ONLY (OUTPATIENT)
Dept: FAMILY MEDICINE CLINIC | Facility: CLINIC | Age: 34
End: 2024-01-03

## 2024-01-03 ENCOUNTER — TELEPHONE (OUTPATIENT)
Dept: FAMILY MEDICINE CLINIC | Facility: CLINIC | Age: 34
End: 2024-01-03

## 2024-01-03 NOTE — TELEPHONE ENCOUNTER
Eye exam-Dr. Tari Mccarthy-CHRYSTAL 1/2/2024    Assessments:    1.  Type 2 diabetes mellitus without complications, no retinopathy bilaterally.  2.  Other vitreous opacities, bilateral  3.  Primary open-angle glaucoma, bilateral, moderate stage, start latanoprost, follow-up in 3 weeks for IOP check and HVF.  
Yes

## 2024-01-09 DIAGNOSIS — E11.65 UNCONTROLLED TYPE 2 DIABETES MELLITUS WITH HYPERGLYCEMIA (HCC): ICD-10-CM

## 2024-01-10 RX ORDER — GLIMEPIRIDE 2 MG/1
2 TABLET ORAL
Qty: 30 TABLET | Refills: 0 | OUTPATIENT
Start: 2024-01-10

## 2024-01-10 RX ORDER — DEXTROAMPHETAMINE SULFATE 10 MG/1
10 TABLET ORAL 3 TIMES DAILY
Qty: 90 TABLET | Refills: 0 | OUTPATIENT
Start: 2024-01-10

## 2024-01-10 RX ORDER — GLIMEPIRIDE 2 MG/1
2 TABLET ORAL
Qty: 30 TABLET | Refills: 0 | Status: SHIPPED | OUTPATIENT
Start: 2024-01-10

## 2024-01-10 NOTE — TELEPHONE ENCOUNTER
30 day sent of the glimepiride.  Called patient and left msg that he will need to get the dextroamphetamine filled from the provider who initially prescribed this or speak to Dr Anderson about this at his upcoming appt next week      OK to refill the glimepride #30. Find out from the patient who prescribed the Dextroamphetamine and he should get  refilled from that prescriber as I did not prescribe it.     Thanks.

## 2024-01-11 DIAGNOSIS — R80.9 MICROALBUMINURIA: ICD-10-CM

## 2024-01-11 DIAGNOSIS — I10 ESSENTIAL HYPERTENSION: ICD-10-CM

## 2024-01-11 RX ORDER — LOSARTAN POTASSIUM 100 MG/1
100 TABLET ORAL DAILY
Qty: 90 TABLET | Refills: 0 | Status: SHIPPED | OUTPATIENT
Start: 2024-01-11

## 2024-01-11 NOTE — TELEPHONE ENCOUNTER
Requested Prescriptions     Pending Prescriptions Disp Refills    LOSARTAN 100 MG Oral Tab [Pharmacy Med Name: Losartan Potassium 100 Mg Tab [facility]] 90 tablet 0     Sig: TAKE 1 TABLET (100 MG TOTAL) BY MOUTH DAILY.     Last fill was 10/16/23 #90    Met protocol. Refill sent.

## 2024-01-18 ENCOUNTER — OFFICE VISIT (OUTPATIENT)
Dept: FAMILY MEDICINE CLINIC | Facility: CLINIC | Age: 34
End: 2024-01-18
Payer: COMMERCIAL

## 2024-01-18 VITALS
SYSTOLIC BLOOD PRESSURE: 138 MMHG | TEMPERATURE: 97 F | HEIGHT: 68 IN | RESPIRATION RATE: 18 BRPM | OXYGEN SATURATION: 98 % | BODY MASS INDEX: 45.16 KG/M2 | HEART RATE: 88 BPM | DIASTOLIC BLOOD PRESSURE: 80 MMHG | WEIGHT: 298 LBS

## 2024-01-18 DIAGNOSIS — B35.3 TINEA PEDIS OF LEFT FOOT: ICD-10-CM

## 2024-01-18 DIAGNOSIS — E66.01 OBESITY, MORBID (HCC): ICD-10-CM

## 2024-01-18 DIAGNOSIS — G47.33 OSA (OBSTRUCTIVE SLEEP APNEA): ICD-10-CM

## 2024-01-18 DIAGNOSIS — R80.9 MICROALBUMINURIA DUE TO TYPE 2 DIABETES MELLITUS  (HCC): Primary | ICD-10-CM

## 2024-01-18 DIAGNOSIS — I10 ESSENTIAL HYPERTENSION: ICD-10-CM

## 2024-01-18 DIAGNOSIS — E11.29 MICROALBUMINURIA DUE TO TYPE 2 DIABETES MELLITUS  (HCC): Primary | ICD-10-CM

## 2024-01-18 LAB
CARTRIDGE LOT#: 641 NUMERIC
HEMOGLOBIN A1C: 7.2 % (ref 4.3–5.6)

## 2024-01-18 PROCEDURE — 3079F DIAST BP 80-89 MM HG: CPT | Performed by: FAMILY MEDICINE

## 2024-01-18 PROCEDURE — 3075F SYST BP GE 130 - 139MM HG: CPT | Performed by: FAMILY MEDICINE

## 2024-01-18 PROCEDURE — 99214 OFFICE O/P EST MOD 30 MIN: CPT | Performed by: FAMILY MEDICINE

## 2024-01-18 PROCEDURE — 3008F BODY MASS INDEX DOCD: CPT | Performed by: FAMILY MEDICINE

## 2024-01-18 PROCEDURE — 83036 HEMOGLOBIN GLYCOSYLATED A1C: CPT | Performed by: FAMILY MEDICINE

## 2024-01-18 PROCEDURE — 3051F HG A1C>EQUAL 7.0%<8.0%: CPT | Performed by: FAMILY MEDICINE

## 2024-01-18 RX ORDER — TIRZEPATIDE 5 MG/.5ML
5 INJECTION, SOLUTION SUBCUTANEOUS WEEKLY
Qty: 6 ML | Refills: 0 | Status: SHIPPED | OUTPATIENT
Start: 2024-01-18

## 2024-01-18 RX ORDER — PRENATAL VIT 91/IRON/FOLIC/DHA 28-975-200
1 COMBINATION PACKAGE (EA) ORAL NIGHTLY
Qty: 28.4 G | Refills: 3 | Status: SHIPPED | OUTPATIENT
Start: 2024-01-18

## 2024-01-18 RX ORDER — LATANOPROST 50 UG/ML
1 SOLUTION/ DROPS OPHTHALMIC NIGHTLY
COMMUNITY
Start: 2024-01-02

## 2024-01-18 NOTE — PROGRESS NOTES
The 21st Century Cures Act makes medical notes like these available to patients in the interest of transparency. Please be advised this is a medical document. Medical documents are intended to carry relevant information, facts as evident, and the clinical opinion of the practitioner. The medical note is intended as peer to peer communication and may appear blunt or direct. It is written in medical language and may contain abbreviations or verbiage that are unfamiliar.       Petar Ellis is a 33 year old male.    HPI:     Chief Complaint   Patient presents with    Diabetes       This 33 year old male presents to the office for follow up on his diabetes, weight and hypertension. I had last seen the patient on 12/14/23 and started him on Mounjaro 2.5 mg once weekly. He has been tolerating the medication without any difficulty. He finally went for his diabetic eye exam in December. He denies any constipation, nausea or vomiting since starting the Mounjaro. He is still taking the Metformin 1000 mg bid and glimepiride 2 mg daily.    He went for his sleep study on 12/26/23 which confirmed JAIRO. CPAP was recommended. He has not yet seen the sleep doctor.    HISTORY:  Past Medical History:   Diagnosis Date    Essential hypertension 04/08/2021    Mixed hyperlipidemia due to type 2 diabetes mellitus  ([facility]) 04/08/2021    Obesity, morbid ([facility]) 02/25/2021    Uncontrolled type 2 diabetes mellitus with hyperglycemia ([facility]) 02/25/2021      History reviewed. No pertinent surgical history.   Family History   Problem Relation Age of Onset    Diabetes Father     Heart Disorder Father       Social History:   Social History     Socioeconomic History    Marital status:    Tobacco Use    Smoking status: Never    Smokeless tobacco: Never   Vaping Use    Vaping Use: Never used   Substance and Sexual Activity    Alcohol use: Never    Drug use: Never   Other Topics Concern    Caffeine Concern No    Exercise No    Seat Belt Yes     Special Diet No    Stress Concern No    Weight Concern No     Service No    Blood Transfusions No    Occupational Exposure No    Hobby Hazards No    Sleep Concern No    Back Care No    Bike Helmet No    Self-Exams No        Medications (Active prior to today's visit):  Current Outpatient Medications   Medication Sig Dispense Refill    latanoprost 0.005 % Ophthalmic Solution Place 1 drop into both eyes nightly.      Tirzepatide (MOUNJARO) 5 MG/0.5ML Subcutaneous Solution Pen-injector Inject 5 mg into the skin once a week. 6 mL 0    terbinafine (LAMISIL AT) 1 % External Cream Apply 1 Application topically at bedtime. 28.4 g 3    LOSARTAN 100 MG Oral Tab TAKE 1 TABLET (100 MG TOTAL) BY MOUTH DAILY. 90 tablet 0    glimepiride 2 MG Oral Tab Take 1 tablet (2 mg total) by mouth daily with breakfast. 30 tablet 0    Dextroamphetamine Sulfate 10 MG Oral Tab Take 1 tablet (10 mg total) by mouth 3 (three) times daily.      atorvastatin 20 MG Oral Tab Take 1 tablet (20 mg total) by mouth nightly. 90 tablet 1    metFORMIN HCl 1000 MG Oral Tab Take 1 tablet (1,000 mg total) by mouth 2 (two) times daily with meals. 180 tablet 1       Allergies:  Allergies   Allergen Reactions    Lisinopril OTHER (SEE COMMENTS)     Hives and lip swelling         ROS:     Pertinent positives and pertinent negatives are as listed in HPI.    All other review of symptoms were reviewed and negative.    PHYSICAL EXAM:   /80   Pulse 88   Temp 97 °F (36.1 °C) (Temporal)   Resp 18   Ht 5' 8\" (1.727 m)   Wt 298 lb (135.2 kg)   SpO2 98%   BMI 45.31 kg/m²     Wt Readings from Last 3 Encounters:   01/18/24 298 lb (135.2 kg)   12/14/23 (!) 304 lb (137.9 kg)   11/16/23 291 lb 6.4 oz (132.2 kg)       BP Readings from Last 3 Encounters:   01/18/24 138/80   12/14/23 138/74   11/16/23 (!) 130/100     Weight is down 6 # from 12/14/2023 OV.    General: Morbidly obese, well hydrated. No acute distress. No pallor.   HEENT: Normocephalic, atraumatic.   MIAN, EOMI, Sclera clear and non icteric bilaterally. TM's normal, nose without congestion, pharynx without redness or exudates. Moist mucous membranes.  Neck: Supple. No lymphadenopathy. No thyromegaly. No bruits noted.  Heart: RRR without S3 or S4 or murmur.  Lungs: Clear to auscultation bilaterally. No rales, rhonchi or wheezes. No tachypnea or retractions noted.  Extremities: No edema bilaterally.  Bilateral barefoot skin diabetic exam is abnormal with  tinea pedis noted to the left foot and onychomycosis noted to the left great toe. Monofilament testing was normal bilaterally. No skin break down was noted. Normal DP pulses bilaterally.  Skin: Warm and dry.  Neuro: Alert and oriented x 3, normal gait.  Psych: Normal mood and affect.     ASSESSMENT/PLAN:   33 year old male with    LABS This Visit:    Results for orders placed or performed in visit on 01/18/24   HEMOGLOBIN A1C    Collection Time: 01/18/24 12:23 PM   Result Value Ref Range    HEMOGLOBIN A1C 7.2 (A) 4.3 - 5.6 %    Cartridge Lot# [id number] Numeric    Cartridge Expiration Date 09/30/2025 Date        1. Microalbuminuria due to type 2 diabetes mellitus  (HCC)    Lab Results   Component Value Date    A1C 7.2 (A) 01/18/2024    A1C 6.5 (A) 08/10/2023    A1C 9.2 (H) 02/02/2023    A1C 7.3 (A) 11/02/2022    A1C 9.2 (A) 08/04/2022      The patient was informed his hemoglobin A1c has gone up from his last 1 done in August.  I am increasing his Mounjaro to 5 mg injected once weekly and he should continue with his glimepiride 2 mg daily and his metformin at 1000 mg twice daily.  Diabetic foot care is discussed.  And he will follow-up again in 3 months.    - HEMOGLOBIN A1C  - Tirzepatide (MOUNJARO) 5 MG/0.5ML Subcutaneous Solution Pen-injector; Inject 5 mg into the skin once a week.  Dispense: 6 mL; Refill: 0    2. Obesity, morbid (HCC)    The patient has lost 6 pounds since starting his Mounjaro.  Side effects of the Mounjaro were reviewed.  I did remind him not to  overeat as this can cause nausea, vomiting, belching, increased heartburn and constipation.  He may take a stool softener or fiber Gummies as needed.  I encouraged him to increase his exercise.    - Tirzepatide (MOUNJARO) 5 MG/0.5ML Subcutaneous Solution Pen-injector; Inject 5 mg into the skin once a week.  Dispense: 6 mL; Refill: 0    3. Essential hypertension    Patient's blood pressure is controlled on his losartan 100 mg daily    4. Tinea pedis of left foot    Diabetic foot care is reviewed with the patient.  He does have some mild tinea pedis noted.  I will treat with Lamisil topically.  He is to continue to watch for signs of worsening infection or skin breakdown.    - terbinafine (LAMISIL AT) 1 % External Cream; Apply 1 Application topically at bedtime.  Dispense: 28.4 g; Refill: 3    5. JAIRO    Patient advised to follow up with Dr. Bains so his CPAP can be started.    Other orders  - latanoprost 0.005 % Ophthalmic Solution; Place 1 drop into both eyes nightly.         Health Maintenance:    Health Maintenance   Topic Date Due    Diabetes Care Foot Exam (Annual)  Completed    Diabetes Care A1C  7/18/2024    Influenza Vaccine (1) 11/16/2024 (Originally 10/1/2023)    COVID-19 Vaccine (4 - 2023-24 season) 12/14/2024 (Originally 9/1/2023)    Diabetes Care: GFR  02/02/2024    Diabetes Care: Microalb/Creat Ratio  08/10/2024    Annual Physical  08/10/2024    Diabetes Care Dilated Eye Exam  01/02/2025    DTaP,Tdap,and Td Vaccines (2 - Td or Tdap) 02/25/2031    Annual Depression Screening  Completed    Pneumococcal Vaccine: Birth to 64yrs  Completed         Meds This Visit:  Requested Prescriptions     Signed Prescriptions Disp Refills    Tirzepatide (MOUNJARO) 5 MG/0.5ML Subcutaneous Solution Pen-injector 6 mL 0     Sig: Inject 5 mg into the skin once a week.    terbinafine (LAMISIL AT) 1 % External Cream 28.4 g 3     Sig: Apply 1 Application topically at bedtime.       Imaging & Referrals:  None     Patient  understands plan and follow-up.  FU in 3 months for recheck on DM, HTN and weight    1/18/2024  Italia Anderson DO    Total time: 30  minutes including precharting, H&P, plan of care    This dictation was performed with a verbal recognition program (DRAGON) and it was checked for errors. It is possible that there are still dictated errors within this office note. If so, please bring any errors to my attention for an addendum. All efforts were made to ensure that this office note is accurate

## 2024-01-18 NOTE — PATIENT INSTRUCTIONS
I am increasing your Mounjaro to 5 mg once weekly for the next 3 months. This can cause nausea, vomiting, belching and heart burn and constipation especially if you over eat. If you feel full, stop eating.    Drink 64 ounces of water daily. You may take fiber gummies or a stool softener as needed for constipation.    Apply the Lamisil cream to the rash on the left foot once nightly until it resolves.     Apply lotion to your feet to keep the skin hydrated.    See me in 3 months for recheck on your diabetes and blood pressure.    Video [employer]  What is Type 2 Diabetes?  Watch this clip to understand what happens within your body when you have type 2 diabetes, and the importance of keeping your blood glucose levels within a healthy range.  To watch the video:  Scan the QR code  Using your mobile device, scan the following code:  OR  Go to the website:  [employer]  Enter the prescription code:  1576E    © 6810-2310 [employer]. All rights reserved. This information is not intended as a substitute for professional medical care. Always follow your healthcare professional's instructions.    Managing Type 2 Diabetes  Type 2 diabetes is a long-term (chronic) condition. Managing diabetes may mean making some tough changes. Yourhealthcare team can help you.  To manage type 2 diabetes, you'll need to balance your medicine with diet and activity. You will also need check your blood sugar. And work with yourhealthcare provider to prevent complications.    Take your medicine  You may take pills or give yourself insulin shots for diabetes. Or you may use both. Take your medicines or give yourself insulin at the right times to help you control your blood sugar. Think about ways that will help you remember to take your medicines the right way every day. Ask your healthcare provider orteam for ideas.  You may only take pills for your diabetes. But this may change. Over time, mostpeople with type  2 diabetes also need insulin.  Eat healthy  A healthy diet helps control the amount of sugar in your blood. It also helps you stay at a healthy weight. Or it helps you lose weight, if if you'reoverweight. Extra weight makes it harder to control diabetes.  Your healthcare team will help you create a plan that works for you. You don'thave to give up all the foods you like. Have meals and snacks with:  Vegetables  Fruits  Lean meats or other healthy proteins  Whole grains  Low- or nonfat dairy products     Be physically active  Being active helps lower your blood sugar. Activity helps your body use insulinto turn food into energy. It also helps you manage your weight:  Ask your healthcare provider to help you to make an activity program that's right for you. Your program is based on your age, general health, and types of activity you enjoy. Start slow. But aim for at least 150 minutes of exercise or activity each week. Start with 30 minutes a day. Exercise in 10-minute blocks. Don’t let more than 2 days go by without being active.  Check your blood sugar  Checking your own blood sugar may be a regular part of your care. Or you may only need to check your blood sugar from time to time. Your healthcare provider will tell you how to check your blood sugar at home. Checking it tells you if your blood sugar is in your target range. Having your blood sugars within thetarget range means that you are managing your diabetes well.  If your blood sugar levels are too high or too low, your healthcare provider may suggest changes to your diet or activity level. He or she may also adjustyour medicine.  Your healthcare provider may also tell you to check your blood sugar more oftenwhen you are sick.  Take care of yourself  When you have diabetes, you may be more likely to get other health problems. They include foot, eye, heart, nerve, and kidney problems. You can help prevent these problems by controlling your blood sugar and taking  good care of yourself. Your healthcare provider, nurse, diabetes educator, and others canhelp you with the following:  Checkups. You should have regular checkups with your healthcare provider. At those visits, you will have a physical exam that includes checking your feet. Your healthcare provider will also check your blood pressure and weight. Take your shoes off before your appointment starts to be sure your feet are checked.  Other exams. You'll also need eye, foot, and dental exams at least once each year.  Lab tests. You will have blood and urine tests:  Your healthcare provider will check your hemoglobin A1C at least twice a year. This blood test shows how well you have been controlling your blood sugar over 2 to 3 months. The results help your healthcare provider manage your diabetes.    You will also have other lab tests. For example, to check for kidney problems and abnormal cholesterol levels.  Smoking. If you smoke, you will need to quit. Smoking makes it more likely to get complications from diabetes. Ask your healthcare provider about ways to quit. Also don't use e-cigarette, or vaping, products.  Vaccines. Get a yearly flu shot. And ask your healthcare provider about vaccines to prevent pneumonia, shingles, and hepatitis B.  Stress and depression  Most people have challenges throughout their lives. Living with diabetes can increase your stress. Feeling stressed or depressed can actually affect yourblood sugar levels.  Tell your healthcare provider if you are having trouble coping with diabetes.He or she can help or refer you to other providers or programs.  To learn more  Know where you can get help. You can try the following:  Support. Ask family and friends to support your efforts to take care of yourself. Or look for a diabetes support group nearby or on the internet. Check the Connect with Others link on www.diabetes.org.  Counseling. Talk with a , psychologist, psychiatrist, or other  counselor.  Information. Contact the American Diabetes Association at 230-334-3587 or www.diabetes.org  [employer] last reviewed this educational content on11/1/2019    © 6848-6724 [employer]. All rights reserved. This information is not intended as a substitute for professional medical care. Always follow yourhealthcare professional's instructions.    Type 2 Diabetes and Food Choices  You make food choices every day. Whole wheat or white bread? A side of French fries or fresh fruit? Eat now or later? Choices about what, when, and how much you eat affect your blood sugar (glucose), and also your blood pressure and cholesterol. Understanding how food affects blood glucose is the first step in managing diabetes. And following a diabetesmeal plan can help you keep your blood glucose levels on track.   Prevent problems  Having type 2 diabetes means that your body doesn’t control blood glucose well. When blood glucose stays too high for too long, serious health problems can develop. It's important to control your blood glucose through diet, exercise, and medicine. This can delay or prevent kidney,eye, nerve, and heart disease, and other complications of diabetes.   Control carbohydrates  Carbohydrates are foods that have the biggest effect on your blood glucose levels. After you eat carbohydrates, your blood glucose rises. Fruit, sweet foods and drinks, starchy foods (such as bread, potatoes, and rice), and milk and milk products contain carbohydrates. Carbohydrates are important for health. But when you eat too many at once, your blood glucose can go too high. This is even more likely if you don't have or take enough insulin forthat food.   Some carbohydrates may raise blood glucose more than others. These include potatoes, sweets, and white bread. Better choices are less processed foods with more fiber and nutrients. Good options are 100% whole-wheat bread, oatmeal,brown rice, and nonstarchy vegetables.    Learn to use food labels that show added sugar. And try to find healthierchoices, particularly if you are overweight.    Food and medicine  Insulin helps glucose move from the blood into your muscle cells, where it can be used for energy. Some diabetes medicines that are taken by mouth help you make more insulin. Or they help your insulin work more efficiently. So your medicines and food plan have to work together. If you take insulin shots, you need to be very careful to match the amount of carbohydrates you eat with your insulin dose. If you have too many carbohydrates without adjusting your insulin dose, your blood glucose might become too high. If you have too few carbohydrates, your blood glucose might be too low. Your healthcare provider ora dietitian can help you match your food choices to your medicine.   Have a meal plan  With certain medicines, it's best to eat the same amount of food at the same time every day. That keeps your glucose levels stable. And it helps your medicine work best. Physical activity is an important way to control blood glucose, too. Try to exercise at the same time every day. That way you can build the extra calories you need for exercise into your meal plan. With othermedicines, you may have more choices about how much you eat and when.   If you want to change your medicine to better fit your lifestyle, talk withyour healthcare provider.   Eat smart  You can eat the same foods as everyone else, but you have to carefully watch for certain details. That’s where your diabetes meal plan comes in. A personal meal plan tells you the time of day to eat meals and snacks, the types of food to eat, and how much. Itshould include your favorite foods. And it should focus on these healthy foods:   Whole grains, such as 100% whole-wheat bread, brown rice, and oatmeal  Nonfat or low-fat dairy products, such as nonfat milk and yogurt (but be sure these products don't have sugar added to make up  for the fat removed)  Lean meats, poultry, fish, eggs, and dried beans and peas  Foods and drinks with no added sugar  Fruits and vegetables  At first, it may be helpful to use measuring cups and spoons to make sure you’re really eating the amount of food that’s in your plan. You can also use standardized portion sizes on food labels, such as 1 serving of meat being the size of a deck of cards. By checking your blood glucose 1 to 2 hours after eating, you can learn how your food choicesaffect your blood glucose.   To create a diabetes meal plan or change a plan that’s not working for you, see a dietitian or diabetes educator. Let them know if you have any new health concerns or if your medicines have changed. Having ameal plan that you can live with will keep you at your healthy best.     © 1559-8430 [employer]. All rights reserved. This information is not intended as a substitute for professional medical care. Always follow yourhealthcare professional's instructions.    Diabetes: Caring for Your Body   When you have diabetes, your body needs special care. This care helps you stay healthy and prevent problems. Exercise and healthy eating are a part of this. You can also protect yourself by taking special care of your feet, skin, teeth,and eyes.   Caring for your feet     Follow these tips to help keep your feet healthy:  Check your feet every day for redness, blisters, cracks, dry skin, or numbness. Use a mirror to check the bottoms of your feet, if needed. Or ask for help.  Wash your feet in warm (not hot) water. Don’t soak them.  Use an emery board to keep your toenails even with the ends of your toes. File away sharp edges. A foot doctor (podiatrist) may need to cut your toenails for you.  Smooth down calluses gently or wait until your next podiatry appointment.  Keep your skin soft and smooth by putting a thin layer of skin lotion on the tops and bottoms of your feet. Don't put lotion in between  your toes.  Always wear shoes or slippers, even inside your home. Make sure that shoes are correctly fitted, not too tight and not too loose. This can cause friction and rubbing of your feet. Change your socks daily. Always check shoes for foreign objects before putting them on.  Call your healthcare provider right away if your feet are numb or painful. Also call your provider if a cut or sore doesn’t heal in a few days.  Preventing skin infections   To prevent skin infections, bathe every day, but use a moderate water temperature.. Dry yourself well, especially between your toes. Try to keep your home on the humid side during the colder months of the year to prevent your skin getting dry. Wash any cuts with warm, soapy water. Cover with a sterile bandage. Call your healthcare provider if a cut or sore doesn't heal in a fewdays, feels warm, itches, is swollen, or has a bad smell.   Caring for your teeth   Follow these guidelines for healthy teeth:  Brush your teeth twice daily.  Floss your teeth daily.  See your dentist at least twice yearly.  Keep your blood sugar in a good range.  Caring for your eyes  Have your eyes checked every year by an optometrist or ophthalmologist. Letyour healthcare provider know if you experience any of the following symptoms:   Blurred vision  Dark spots or \"holes\"  Flashes of light  Seeing more floaters than usual  Poor night vision  If you smoke, quit  Smoking is dangerous for everyone, especially people with diabetes. It can harm the blood vessels in your eyes, kidneys, nerves, and heart. It raises blood pressure. Smoking can also slow healing, so infections are more likely. Askyour healthcare provider about programs to help you stop smoking.   [employer] last reviewed this educational content on12/1/2021 © 2000-2022 [employer]. All rights reserved. This information is not intended as a substitute for professional medical care. Always [facility]  professional's instructions.

## 2024-02-02 ENCOUNTER — OFFICE VISIT (OUTPATIENT)
Facility: CLINIC | Age: 34
End: 2024-02-02
Payer: COMMERCIAL

## 2024-02-02 VITALS
RESPIRATION RATE: 20 BRPM | TEMPERATURE: 98 F | WEIGHT: 301 LBS | HEART RATE: 88 BPM | BODY MASS INDEX: 45.62 KG/M2 | SYSTOLIC BLOOD PRESSURE: 144 MMHG | HEIGHT: 68 IN | DIASTOLIC BLOOD PRESSURE: 90 MMHG | OXYGEN SATURATION: 92 %

## 2024-02-02 DIAGNOSIS — G47.19 DAYTIME HYPERSOMNOLENCE: ICD-10-CM

## 2024-02-02 DIAGNOSIS — G47.33 OSA (OBSTRUCTIVE SLEEP APNEA): ICD-10-CM

## 2024-02-02 DIAGNOSIS — E66.01 OBESITY, MORBID (HCC): Primary | ICD-10-CM

## 2024-02-02 PROCEDURE — 99204 OFFICE O/P NEW MOD 45 MIN: CPT | Performed by: INTERNAL MEDICINE

## 2024-02-02 NOTE — PATIENT INSTRUCTIONS
Initiate  Bilevel at 24/20 cm H2O,  with a [employer] Sim[employer] full face mask, size Medium.   Then Obtain Download data for compliance and efficacy from biPAP machine in 30 days   Advised about weight loss   Advised against drowsy driving and to avoid alcoholic beverage and respiratory depressants as these may worsen sleep apnea       Follow up: 2  months       Shade Link MD      Obstructive Sleep Apnea  Obstructive sleep apnea is a condition caused by air passages becoming narrowed or blocked during sleep. As a result, breathing stops for short periods. Your body wakes up enough for breathing to start again. But you don't remember it. The cycle of stopped breathing and brief awakenings can repeat dozens of times a night. This prevents the body from getting to the deeper stages of sleep that are needed for good rest.   Signs of sleep apnea include loud snoring, noisy breathing, and gasping sounds during sleep. People with sleep apnea often find they use the bathroom many times during the night. Daytime symptoms include waking up tired after a full night's sleep and waking up with headaches. They can also include feeling very sleepy or falling asleep during the day, and having problems with memory or concentration.   Risk factors for sleep apnea include:  Being overweight  Being assigned male at birth, or being in menopause  Smoking  Using alcohol or sedating medicines  Having enlarged structures in the nose or throat such as enlarged tonsils or adenoids, or extra tissue in the airway  Home care  Lifestyle changes that can help treat snoring and sleep apnea include:   If you're overweight, talk with your healthcare provider about a weight-loss plan for you.  Don't drink alcohol for 3 to 4 hours before bedtime.  Don't take sedating medicines. Ask your healthcare provider about the medicines you take.  If you smoke, talk to your provider about ways to quit. It's important to stay away from secondhand smoke.  Don't use e-cigarettes because of their harmful side effects.  Sleep on your side. This can help prevent gravity from pulling relaxed throat tissues into your breathing passages.  If you have allergies or sinus problems that block your nose, ask your provider for help.  Use positive airway pressure (PAP). Discuss with your provider the benefits of using PAP at home. And talk about the type of PAP that's best for you.  Follow-up care  Follow up with your healthcare provider, or as advised. A diagnosis of sleep apnea is made with a sleep study. Your provider can tell you more about this test.   When to get medical care  See your healthcare provider if you have daytime symptoms of sleep apnea. These include:   Waking up tired after a full night's sleep  Waking up with a headache  Feeling very sleepy or falling asleep during the day  Having problems with memory or concentration  Also talk with your provider if your partner tells you that you snore, gasp for air, or stop breathing while you sleep.   Seeing your provider is important because sleep apnea can make you more likely to have certain health problems. These include high blood pressure, heart attack, stroke, and sexual dysfunction. If you have sleep apnea, talk with your healthcare provider about the best treatments for you.   [employer] last reviewed this educational content on 5/1/2022 © 2000-2023 [employer]. All rights reserved. This information is not intended as a substitute for professional medical care. Always follow your healthcare professional's instructions.        Continuous Positive Air Pressure (CPAP)     A mask over the nose gently directs air into the throat to keep the airway open.     Continuous positive air pressure (CPAP) uses gentle air pressure to hold the airway open. CPAP is often the most effective treatment for sleep apnea. It works very well as a treatment for adults diagnosed with obstructive sleep apnea with a lot of  sleepiness. But keep in mind that it can take several adjustments before the setup is right for you.   How CPAP works  The CPAP machine  is a small portable pump that sits beside the bed. The pump sends air through a hose, which is held over your nose alone, or nose and mouth by a mask. Mild air pressure is gently pushed through your airway. The air pressure nudges sagging tissues aside. This widens the airway so you can breathe better. CPAP may be combined with other kinds of therapy for sleep apnea.   Types of air pressure treatments  There are different types of CPAP. Your doctor or CPAP technician will help you decide which type is best for you:   Basic CPAP keeps the pressure constant all night long.  A bilevel device (BiPAP) provides more pressure when you breathe in and less when you breathe out. A BiPAP machine also may be set to provide automatic breaths to maintain breathing if you stop breathing while sleeping.  An autoCPAP device automatically adjusts pressure throughout the night and in response to changes such as body position, sleep stage, and snoring.  [employer] last reviewed this educational content on 7/1/2019 © 2000-2023 [employer]. All rights reserved. This information is not intended as a substitute for professional medical care. Always follow your healthcare professional's instructions.

## 2024-02-02 NOTE — PROGRESS NOTES
Pulmonary/Critical Care/Sleep Medicine    Consult Note     PCP: Italia Anderson DO   Phone: [phone]   Fax: [phone]          Chief Complaint   Patient presents with    Consult     Sleep consult / sleep study 12/2023       HPI  I had the pleasure of seeing Valdez Ellis who is a pleasant 33 year old male who presents for evaluation of JAIRO       The patient states that he has snored at home in past and was noted to have witnessed apnea, he underwent a sleep study, so he presents for sleep evaluation.      He states bed time around 10 PM . It takes few min  to fall asleep and leaves bed around  6-7  AM. He wakes up sometimes 3 to 5 times a night.  He is sleepy and fatigued during the daytime. Naps during daytime.  He admits to tossing and turning at night.  He denies nightmares, sleep talking or sleep walking.  He   denies AM headaches.  He denies symptoms sleep attacks,      The patient occasionally kicks legs at night. Denies  teeth grinding.         He drinks  no caffeine       He has pets 2 dogs  that does not sleep in bed.         Hx of tobacco use: He  reports that he has never smoked. He has never used smokeless tobacco.    Past Medical History:   Diagnosis Date    Essential hypertension 04/08/2021    Mixed hyperlipidemia due to type 2 diabetes mellitus  ([facility]) 04/08/2021    Obesity, morbid ([facility]) 02/25/2021    Uncontrolled type 2 diabetes mellitus with hyperglycemia ([facility]) 02/25/2021      History reviewed. No pertinent surgical history.  Allergies   Allergen Reactions    Lisinopril OTHER (SEE COMMENTS)     Hives and lip swelling       Current Outpatient Medications   Medication Sig Dispense Refill    latanoprost 0.005 % Ophthalmic Solution Place 1 drop into both eyes nightly.      Tirzepatide (MOUNJARO) 5 MG/0.5ML Subcutaneous Solution Pen-injector Inject 5 mg into the skin once a week. 6 mL 0    LOSARTAN 100 MG Oral Tab TAKE 1 TABLET (100 MG TOTAL) BY MOUTH DAILY. 90 tablet 0    glimepiride 2 MG  Oral Tab Take 1 tablet (2 mg total) by mouth daily with breakfast. 30 tablet 0    Dextroamphetamine Sulfate 10 MG Oral Tab Take 1 tablet (10 mg total) by mouth 3 (three) times daily.      atorvastatin 20 MG Oral Tab Take 1 tablet (20 mg total) by mouth nightly. 90 tablet 1    metFORMIN HCl 1000 MG Oral Tab Take 1 tablet (1,000 mg total) by mouth 2 (two) times daily with meals. 180 tablet 1    terbinafine (LAMISIL AT) 1 % External Cream Apply 1 Application topically at bedtime. (Patient not taking: Reported on 2/2/2024) 28.4 g 3      Social History     Socioeconomic History    Marital status:    Occupational History    Occupation: manetch     Employer: [employer]    Tobacco Use    Smoking status: Never    Smokeless tobacco: Never   Vaping Use    Vaping Use: Never used   Substance and Sexual Activity    Alcohol use: Never    Drug use: Never   Other Topics Concern    Caffeine Concern No    Exercise No    Seat Belt Yes    Special Diet No    Stress Concern No    Weight Concern No     Service No    Blood Transfusions No    Occupational Exposure No    Hobby Hazards No    Sleep Concern No    Back Care No    Bike Helmet No    Self-Exams No      Immunization History   Administered Date(s) Administered    Covid-19 Vaccine Pfizer 30 mcg/0.3 ml 12/23/2021, 01/13/2022    Covid-19 Vaccine Pfizer Bivalent 30mcg/0.3mL 09/19/2022    FLU VAC QIV SPLIT 3 YRS AND OLDER (20070) 11/09/2021    FLULAVAL 6 months & older 0.5 ml Prefilled syringe (36741) 11/02/2022    FLUZONE 6 months and older PFS 0.5 ml (96154) 02/25/2021, 11/02/2022    Pneumococcal Conjugate PCV20 02/02/2023    Pneumovax 23 11/09/2021    TDAP 02/25/2021   Pended Date(s) Pended    Influenza Vaccine Refused 11/16/2023   Deferred Date(s) Deferred    Pneumovax 23 04/08/2021      Family History   Problem Relation Age of Onset    Diabetes Father     Heart Disorder Father         Review of Systems   Constitutional:  Positive for fatigue. Negative for fever and  unexpected weight change.   HENT:  Negative for congestion, mouth sores, nosebleeds, postnasal drip, rhinorrhea, sore throat and trouble swallowing.    Eyes:  Negative for visual disturbance.   Respiratory:  Negative for apnea, cough, choking, chest tightness, shortness of breath and wheezing.    Cardiovascular:  Negative for chest pain, palpitations and leg swelling.   Gastrointestinal:  Negative for abdominal pain, constipation, diarrhea, nausea and vomiting.   Genitourinary:  Negative for difficulty urinating.   Musculoskeletal:  Negative for arthralgias, back pain, gait problem and myalgias.   Neurological:  Negative for dizziness, weakness and headaches.   Psychiatric/Behavioral:  Positive for sleep disturbance.         Vitals:    02/02/24 1046   BP: 144/90   Pulse: 88   Resp: 20   Temp: 98 °F (36.7 °C)      SpO2: 92 %  Ht Readings from Last 1 Encounters:   02/02/24 5' 8\" (1.727 m)     Wt Readings from Last 1 Encounters:   02/02/24 (!) 301 lb (136.5 kg)     Body mass index is 45.77 kg/m².     Physical Exam  Constitutional:       General: He is not in acute distress.     Appearance: Normal appearance. He is obese. He is not ill-appearing or diaphoretic.   HENT:      Head: Normocephalic and atraumatic.      Nose: Nose normal. No congestion or rhinorrhea.      Comments: Narrow nares bilaterally      Mouth/Throat:      Mouth: Mucous membranes are moist.      Pharynx: Oropharynx is clear. No oropharyngeal exudate or posterior oropharyngeal erythema.      Comments: Mallampati class IV palate   Eyes:      Extraocular Movements: Extraocular movements intact.      Pupils: Pupils are equal, round, and reactive to light.   Cardiovascular:      Rate and Rhythm: Normal rate.      Pulses: Normal pulses.      Heart sounds: Normal heart sounds. No murmur heard.  Pulmonary:      Effort: Pulmonary effort is normal. No respiratory distress.      Breath sounds: Normal breath sounds. No wheezing or rhonchi.   Chest:      Chest  wall: No tenderness.   Abdominal:      General: Abdomen is flat. Bowel sounds are normal.      Palpations: Abdomen is soft.   Musculoskeletal:         General: Normal range of motion.   Skin:     General: Skin is warm.   Neurological:      General: No focal deficit present.      Mental Status: He is alert and oriented to person, place, and time.   Psychiatric:         Mood and Affect: Mood normal.         Behavior: Behavior normal.         Thought Content: Thought content normal.         Judgment: Judgment normal.             Labs:  Last BMP  Lab Results   Component Value Date     (H) 02/02/2023    BUN 10 02/02/2023    CREATSERUM 0.73 02/02/2023    BUNCREA 19.0 08/20/2021    ANIONGAP 4 02/02/2023    CA 9.4 02/02/2023     02/02/2023    K 4.3 02/02/2023     02/02/2023    CO2 31.0 02/02/2023    OSMOCALC 291 02/02/2023      Last CBC  Lab Results   Component Value Date    WBC 10.5 09/22/2022    RBC 4.81 09/22/2022    HGB 13.7 09/22/2022    HCT 43.8 09/22/2022    MCV 91.1 09/22/2022    MCH 28.5 09/22/2022    MCHC 31.3 09/22/2022    RDW 15.9 09/22/2022    .0 09/22/2022      Last CMP  Lab Results   Component Value Date     (H) 02/02/2023    BUN 10 02/02/2023    BUNCREA 19.0 08/20/2021    CREATSERUM 0.73 02/02/2023    ANIONGAP 4 02/02/2023    CA 9.4 02/02/2023    OSMOCALC 291 02/02/2023    ALKPHO 116 02/02/2023    AST 26 02/02/2023    ALT 41 02/02/2023    BILT 0.8 02/02/2023    TP 8.3 (H) 02/02/2023    ALB 3.8 02/02/2023    GLOBULIN 4.5 (H) 02/02/2023     02/02/2023    K 4.3 02/02/2023     02/02/2023    CO2 31.0 02/02/2023      Last Thyroid Function  Lab Results   Component Value Date    TSH 1.630 09/22/2022        Imaging:  None       Sleep study 12/14/2023 Type: Split Night   Procedure: The Polysomnogram was performed with a sleep technologist in attendance at the [facility]. The following parameters were monitored: central, occipital and temporal EEG, EOG,  submentalis EMG, nasal flow, nasal pressure, respiratory inductance plethysmography and oxygen saturation via continuous pulse oximetry. Titration of positive airway pressure was also performed during the study, with an additional channel for the measurement of CPAP flow. Sleep stages, periodic limb movements and EEG arousals were scored in accordance with the American Academy of Sleep Medicine Manual for the Scoring of Sleep and Associated Events. Apnea Hypopnea Index was calculated using the recommended definition of hypopnea for scoring respiratory events. Data acquisition, collection and scoring have been validated and clinically correlated.   Sleep History: YASMIN LOMAS is a 33 year old Male referred by YOLI DE ([id number]) for the evaluation of suspected obstructive sleep apnea..   Past Medical History is pertinent for Hypertension, Hyperlipidemia, Diabetes Type II, Obesity, Hyperglycemia.   At the time of the study, the patient was prescribed the following medications: Glimepiride, losartan, atorvastatin, metFORMIN HCI.   Sleep Architecture: During the baseline portion of the study, the total recording time was 127.2, total sleep time was 85.0 and sleep efficiency of 66.8%. The sleep latency was 17.7. Wake after sleep onset was 24.5 minutes. The percentage of total sleep time by sleep stage is as follows: Stage 1 99.4%, Stage 2 0.6%, Stage 3 0.0% and Stage REM 0.0%.   During the CPAP Titration portion of the study, the total recording time was 261.6, total sleep time was 256.5 and sleep efficiency was 98.1%. The sleep latency was 4.0. Wake after sleep onset was 1.5 minutes. Percentage of total sleep time by sleep stage is as follows: Stage 1 2.5%, Stage 2 36.5%, Stage 3 0.4% and Stage REM 60.6%.   Respiratory Analysis: During the baseline portion of the study, respiratory monitoring revealed an Apnea-Hypopnea Index (AHI) of 172.9, with an overall Respiratory Disturbance Index (RDI) of 172.9 Report  printed: 26-Dec-23 ABEBE BETTYCOLTON Page 2 of 9     events per hour. The REM AHI was -. The supine AHI was 170.6 events per hour. The non-supine related AHI was 174.7 events per hour. The Central Apnea Index was 0. The oxygen janice was 63.0% and the patient spent 78.1% of sleep time with oxygen levels below 88%. Supplemental oxygen was not used during the study.   During the CPAP portion of the study, respiratory monitoring revealed incomplete resolution of obstructive events with CPAP so BiPAP was initiated and at BiPAP of 24/20 cwp cm H2O there was significant yet incomplete resolution of AHI. Supine REM was not observed at this pressure.   Snoring Profile: During the baseline portion of the study, snoring was severe. During CPAP/BiPAP titration, snoring was resolved. markedly   Cardiac Profile: During the baseline portion of the study, Electrocardiogram demonstrated normal sinus rhythm. During the CPAP/BiPAP Titration portion of the study, Electrocardiogram demonstrated normal sinus rhythm.   EEG Profile: Based on the limited recording montage, during the baseline portion of the study there were no significant EEG abnormalities noted. There were 9 spontaneous arousals, with a total arousal index of 156.7. During the CPAP Titration portion of the study there were no significant EEG abnormalities noted. There were 3 spontaneous arousals, with a total arousal index of 5.3.   Periodic Limb Movements: During the baseline portion of the study, the PLM index of 0. PLM arousal index of 0. During the CPAP Titration portion of the study, the PLM index of 0. PLM arousal index of 0.   IMPRESSIONS: This study reveals obstructive sleep apnea with .9 events per hour and inadequate CPAP titration with persistent events . Although with significant resolution of AHI on BiPAP at 24/20 cwp.   Diagnosis: Obstructive Sleep Apnea G47.33   RECOMMENDATIONS:   1. It is recommended that the patient should be prescribed Bilevel at 24/20  cm H2O, with humidity at (select) and (select).   2. During the titration, the patient was fitted with a [employer] Simplus full face mask, size Medium.   3. The patient should avoid alcohol and sedative medications, as these may worsen severity of symptoms.   4. The patient should avoid drowsy driving.   5. Patient to follow up with a sleep specialist in clinic.     Thank you for allowing me to participate in this patient's care. Please do not hesitate to contact me with any additional questions.   Dictated by: Dr. Link   Electronically signed by Shade Link MD   (Electronic Signature)   Board Certified in Sleep Medicine     [place] Sleepiness Scale: (ESS) score on today's visit is 10  out of 24.     Score total of 1-6    Normal sleep   Score total of 7-8    Average sleepiness   Score total of 9-24    Abnormal (possibly pathologic) sleepiness       Impression:    Obstructive sleep apnea syndrome (OSAS): Split night Attended sleep study performed on 12/14/2023  showed Apnea-Hypopnea Index (AHI) of 172.9, with an overall Respiratory Disturbance Index (RDI) of 172.9 events per hour.The supine AHI was 170.6 events per hour. The non-supine related AHI was 174.7 events per hour. The Central Apnea Index was 0.   Nocturnal Hypoxemia: oxygen janice was 63.0% and the patient spent 78.1% of sleep time with oxygen levels below 88%.that apparently resolved on biPAP   Daytime hypersomnolence/fatigue  Obesity: Class III  ;  Body mass index is 45.77 kg/m².  Hypertension  Hyperlipidemia   Type II diabetes Mellitus                                 Plan:      Initiate  Bilevel at 24/20 cm H2O,  with a [employer] Simplus full face mask, size Medium.   Then Obtain Download data for compliance and efficacy from biPAP machine in 30 days   Advised about weight loss   Advised against drowsy driving and to avoid alcoholic beverage and respiratory depressants as these may worsen sleep apnea       Follow up: 2  months     Thank you  for allowing me to participate in your patient care.    REINA Link MD, FACP, FCCP, FAA - Pulmonary/Critical care/Sleep Medicine  Please contact our office if you have any questions or concerns at [phone]

## 2024-02-05 ENCOUNTER — TELEPHONE (OUTPATIENT)
Facility: CLINIC | Age: 34
End: 2024-02-05

## 2024-02-05 DIAGNOSIS — G47.33 OSA (OBSTRUCTIVE SLEEP APNEA): Primary | ICD-10-CM

## 2024-02-07 DIAGNOSIS — E11.65 UNCONTROLLED TYPE 2 DIABETES MELLITUS WITH HYPERGLYCEMIA (HCC): ICD-10-CM

## 2024-02-07 RX ORDER — GLIMEPIRIDE 2 MG/1
2 TABLET ORAL
Qty: 90 TABLET | Refills: 0 | Status: SHIPPED | OUTPATIENT
Start: 2024-02-07

## 2024-02-24 DIAGNOSIS — R80.9 MICROALBUMINURIA DUE TO TYPE 2 DIABETES MELLITUS (HCC): ICD-10-CM

## 2024-02-24 DIAGNOSIS — E11.29 MICROALBUMINURIA DUE TO TYPE 2 DIABETES MELLITUS (HCC): ICD-10-CM

## 2024-02-24 DIAGNOSIS — E66.01 OBESITY, MORBID (HCC): ICD-10-CM

## 2024-02-26 RX ORDER — TIRZEPATIDE 5 MG/.5ML
5 INJECTION, SOLUTION SUBCUTANEOUS WEEKLY
Qty: 6 ML | Refills: 0 | OUTPATIENT
Start: 2024-02-26

## 2024-02-27 ENCOUNTER — HOSPITAL ENCOUNTER (OUTPATIENT)
Age: 34
Discharge: HOME | End: 2024-02-27
Attending: EMERGENCY MEDICINE
Payer: COMMERCIAL

## 2024-02-27 ENCOUNTER — APPOINTMENT (OUTPATIENT)
Dept: GENERAL RADIOLOGY | Age: 34
End: 2024-02-27
Attending: EMERGENCY MEDICINE
Payer: COMMERCIAL

## 2024-02-27 VITALS
WEIGHT: 300 LBS | DIASTOLIC BLOOD PRESSURE: 114 MMHG | HEART RATE: 101 BPM | RESPIRATION RATE: 16 BRPM | HEIGHT: 68 IN | TEMPERATURE: 97 F | SYSTOLIC BLOOD PRESSURE: 182 MMHG | OXYGEN SATURATION: 92 % | BODY MASS INDEX: 45.47 KG/M2

## 2024-02-27 DIAGNOSIS — J40 BRONCHITIS: Primary | ICD-10-CM

## 2024-02-27 DIAGNOSIS — I10 HYPERTENSION, UNSPECIFIED TYPE: ICD-10-CM

## 2024-02-27 PROCEDURE — 99204 OFFICE O/P NEW MOD 45 MIN: CPT

## 2024-02-27 PROCEDURE — 99213 OFFICE O/P EST LOW 20 MIN: CPT

## 2024-02-27 PROCEDURE — 71046 X-RAY EXAM CHEST 2 VIEWS: CPT | Performed by: EMERGENCY MEDICINE

## 2024-02-27 PROCEDURE — 94664 DEMO&/EVAL PT USE INHALER: CPT

## 2024-02-27 RX ORDER — BENZONATATE 100 MG/1
100 CAPSULE ORAL 3 TIMES DAILY PRN
Qty: 20 CAPSULE | Refills: 0 | Status: SHIPPED | OUTPATIENT
Start: 2024-02-27 | End: 2024-02-27

## 2024-02-27 RX ORDER — BENZONATATE 100 MG/1
100 CAPSULE ORAL 3 TIMES DAILY PRN
Qty: 20 CAPSULE | Refills: 0 | Status: SHIPPED | OUTPATIENT
Start: 2024-02-27

## 2024-02-27 RX ORDER — ALBUTEROL SULFATE 90 UG/1
2 AEROSOL, METERED RESPIRATORY (INHALATION) ONCE
Status: COMPLETED | OUTPATIENT
Start: 2024-02-27 | End: 2024-02-27

## 2024-02-28 NOTE — ED PROVIDER NOTES
Patient Seen in: [facility]      History     Chief Complaint   Patient presents with    Cough     Congestion resulting in difficulty breathing - Entered by patient     Stated Complaint: Cough - Congestion resulting in difficulty breathing    Subjective:   HPI    33-year-old male with a history of diabetes, morbid obesity, hypertension presents to the immediate care for complaints of cough and chest congestion.  Patient states he has had a persistent cough and productive sputum for the last week.  Denies any fevers or chills.  Denies any myalgias.  Patient has no complaints of chest pain.  Denies any pleuritic pain.  Denies any nausea vomiting diaphoresis.  Denies any other exacerbating factors.  Patient states that he has not been compliant with his blood pressure medications.  He typically takes losartan 100 mg a day.  He states that when he does take it his blood pressures typically are 1 40-1 60 systolic.    Objective:   Past Medical History:   Diagnosis Date    Essential hypertension 04/08/2021    Mixed hyperlipidemia due to type 2 diabetes mellitus ([facility]) 04/08/2021    Obesity, morbid ([facility]) 02/25/2021    JAIRO (obstructive sleep apnea)     Uncontrolled type 2 diabetes mellitus with hyperglycemia ([facility]) 02/25/2021              History reviewed. No pertinent surgical history.             Social History     Socioeconomic History    Marital status:    Occupational History    Occupation: [employer]     Employer: [employer]    Tobacco Use    Smoking status: Never     Passive exposure: Never    Smokeless tobacco: Never   Vaping Use    Vaping Use: Never used   Substance and Sexual Activity    Alcohol use: Never    Drug use: Never   Other Topics Concern    Caffeine Concern No    Exercise No    Seat Belt Yes    Special Diet No    Stress Concern No    Weight Concern No     Service No    Blood Transfusions No    Occupational Exposure No    Hobby Hazards No    Sleep Concern No    Back Care No    Bike  Helmet No    Self-Exams No              Review of Systems    Positive for stated complaint: Cough - Congestion resulting in difficulty breathing  Other systems are as noted in HPI.  Constitutional and vital signs reviewed.      All other systems reviewed and negative except as noted above.    Physical Exam     ED Triage Vitals [02/27/24 1916]   BP (!) 182/114   Pulse 101   Resp 16   Temp 97.2 °F (36.2 °C)   Temp src Temporal   SpO2 92 %   O2 Device None (Room air)       Current:BP (!) 182/114   Pulse 101   Temp 97.2 °F (36.2 °C) (Temporal)   Resp 16   Ht 172.7 cm (5' 8\")   Wt 136.1 kg   SpO2 92%   BMI 45.61 kg/m²         Physical Exam  General: Alert and oriented. No acute distress.  HEENT: Normocephalic. No evidence of trauma. Extraocular movements are intact.  Cardiovascular exam: Regular rate and rhythm  Lungs: Diminished breath sounds bilaterally.  Abdomen: Soft, nondistended, nontender.  Extremities: No evidence of deformity. No clubbing or cyanosis.  Neuro: No focal deficit is noted.       ED Course   Labs Reviewed - No data to display  PA lateral chest x-ray was ordered.  This is negative for pneumothorax infiltrate or consolidation.    Patient be discharged home with albuterol inhaler.  He will be given benzonatate cough medication.  Recommend follow-up with his primary care doctor and continuation of his blood pressure medicine.               MDM   Patient was screened and evaluated during this visit.   As a treating physician attending to the patient, I determined, within reasonable clinical confidence and prior to discharge, that an emergency medical condition was not or was no longer present.  There was no indication for further evaluation, treatment or admission on an emergency basis.  Comprehensive verbal and written discharge and follow-up instructions were provided to help prevent relapse or worsening.  Patient was instructed to follow-up with her primary care provider for further evaluation  and treatment, but to return immediately to the ER for worsening, concerning, new, changing or persisting symptoms.  I discussed the case with the patient and they had no questions, complaints, or concerns.  Patient felt comfortable going home.    ^^Please note that this report has been produced using speech recognition software and may contain errors related to that system including, but not limited to, errors in grammar, punctuation, and spelling, as well as words and phrases that possibly may have been recognized inappropriately.  If there are any questions or concerns, contact the dictating provider for clarification                                   Medical Decision Making      Disposition and Plan     Clinical Impression:  1. Bronchitis    2. Hypertension, unspecified type         Disposition:  Discharge  2/27/2024  7:51 pm    Follow-up:  Italia Anderson DO  [id number] [address] [phone]    Call   As needed, If symptoms worsen          Medications Prescribed:  Discharge Medication List as of 2/27/2024  7:55 PM

## 2024-02-28 NOTE — DISCHARGE INSTRUCTIONS
Follow-up with your primary care doctor  Take your blood pressure medications as prescribed on a daily basis  Use albuterol inhaler 2 puffs every 4 hours  Take benzonatate cough medication 3 times a day  Return if any worsening symptoms or new concerns

## 2024-03-02 DIAGNOSIS — E11.29 MICROALBUMINURIA DUE TO TYPE 2 DIABETES MELLITUS (HCC): ICD-10-CM

## 2024-03-02 DIAGNOSIS — R80.9 MICROALBUMINURIA DUE TO TYPE 2 DIABETES MELLITUS (HCC): ICD-10-CM

## 2024-03-02 DIAGNOSIS — E66.01 OBESITY, MORBID (HCC): ICD-10-CM

## 2024-03-04 RX ORDER — TIRZEPATIDE 5 MG/.5ML
5 INJECTION, SOLUTION SUBCUTANEOUS WEEKLY
Qty: 6 ML | Refills: 0 | Status: SHIPPED | OUTPATIENT
Start: 2024-03-04

## 2024-03-04 NOTE — TELEPHONE ENCOUNTER
Requested Prescriptions     Pending Prescriptions Disp Refills    Tirzepatide (MOUNJARO) 5 MG/0.5ML Subcutaneous Solution Pen-injector 6 mL 0     Sig: Inject 5 mg into the skin once a week.     Last refill: 1/18/24 5mg    Last Appointment: 1/18/24    Next Appointment: No future OV's scheduled

## 2024-03-21 DIAGNOSIS — E11.65 UNCONTROLLED TYPE 2 DIABETES MELLITUS WITH HYPERGLYCEMIA (HCC): ICD-10-CM

## 2024-03-22 RX ORDER — GLIMEPIRIDE 2 MG/1
2 TABLET ORAL
Qty: 90 TABLET | Refills: 0 | Status: SHIPPED | OUTPATIENT
Start: 2024-03-22

## 2024-03-31 DIAGNOSIS — R80.9 MICROALBUMINURIA DUE TO TYPE 2 DIABETES MELLITUS (HCC): ICD-10-CM

## 2024-03-31 DIAGNOSIS — E11.29 MICROALBUMINURIA DUE TO TYPE 2 DIABETES MELLITUS (HCC): ICD-10-CM

## 2024-03-31 DIAGNOSIS — E66.01 OBESITY, MORBID (HCC): ICD-10-CM

## 2024-04-01 RX ORDER — TIRZEPATIDE 5 MG/.5ML
5 INJECTION, SOLUTION SUBCUTANEOUS WEEKLY
Qty: 6 ML | Refills: 0 | Status: SHIPPED | OUTPATIENT
Start: 2024-04-01

## 2024-04-01 NOTE — TELEPHONE ENCOUNTER
Requested Prescriptions     Signed Prescriptions Disp Refills    Tirzepatide (MOUNJARO) 5 MG/0.5ML Subcutaneous Solution Pen-injector 6 mL 0     Sig: Inject 5 mg into the skin once a week.     Authorizing Provider: YOLI DE     Ordering User: SAPNA DE JESUS     Refilled per protocol/OV notes

## 2024-04-03 ENCOUNTER — TELEPHONE (OUTPATIENT)
Dept: FAMILY MEDICINE CLINIC | Facility: CLINIC | Age: 34
End: 2024-04-03

## 2024-04-04 ENCOUNTER — TELEPHONE (OUTPATIENT)
Dept: FAMILY MEDICINE CLINIC | Facility: CLINIC | Age: 34
End: 2024-04-04

## 2024-04-07 DIAGNOSIS — Z13.0 SCREENING, ANEMIA, DEFICIENCY, IRON: ICD-10-CM

## 2024-04-07 DIAGNOSIS — Z13.29 SCREENING FOR THYROID DISORDER: ICD-10-CM

## 2024-04-07 DIAGNOSIS — E11.69 MIXED HYPERLIPIDEMIA DUE TO TYPE 2 DIABETES MELLITUS (HCC): ICD-10-CM

## 2024-04-07 DIAGNOSIS — I10 ESSENTIAL HYPERTENSION: ICD-10-CM

## 2024-04-07 DIAGNOSIS — R80.9 MICROALBUMINURIA: ICD-10-CM

## 2024-04-07 DIAGNOSIS — E11.65 UNCONTROLLED TYPE 2 DIABETES MELLITUS WITH HYPERGLYCEMIA (HCC): Primary | ICD-10-CM

## 2024-04-07 DIAGNOSIS — E78.2 MIXED HYPERLIPIDEMIA DUE TO TYPE 2 DIABETES MELLITUS (HCC): ICD-10-CM

## 2024-04-08 RX ORDER — LOSARTAN POTASSIUM 100 MG/1
100 TABLET ORAL DAILY
Qty: 30 TABLET | Refills: 0 | Status: SHIPPED | OUTPATIENT
Start: 2024-04-08

## 2024-04-08 RX ORDER — ATORVASTATIN CALCIUM 20 MG/1
20 TABLET, FILM COATED ORAL NIGHTLY
Qty: 30 TABLET | Refills: 0 | Status: SHIPPED | OUTPATIENT
Start: 2024-04-08

## 2024-04-08 NOTE — TELEPHONE ENCOUNTER
Let the patient know I only gave a 30 day supply on his medications as he needs an appointment. I will place lab orders to be done at [facility] before his next OV. He is due for his diabetic labs and lipid panel so he should be fasting for 8 hours before the blood draw.

## 2024-04-30 DIAGNOSIS — B35.3 TINEA PEDIS OF LEFT FOOT: ICD-10-CM

## 2024-04-30 DIAGNOSIS — E66.01 OBESITY, MORBID (HCC): ICD-10-CM

## 2024-04-30 DIAGNOSIS — R80.9 MICROALBUMINURIA DUE TO TYPE 2 DIABETES MELLITUS (HCC): ICD-10-CM

## 2024-04-30 DIAGNOSIS — E11.65 UNCONTROLLED TYPE 2 DIABETES MELLITUS WITH HYPERGLYCEMIA (HCC): ICD-10-CM

## 2024-04-30 DIAGNOSIS — E11.29 MICROALBUMINURIA DUE TO TYPE 2 DIABETES MELLITUS (HCC): ICD-10-CM

## 2024-04-30 RX ORDER — TIRZEPATIDE 5 MG/.5ML
5 INJECTION, SOLUTION SUBCUTANEOUS WEEKLY
Qty: 6 ML | Refills: 0 | Status: SHIPPED | OUTPATIENT
Start: 2024-04-30

## 2024-04-30 RX ORDER — PRENATAL VIT 91/IRON/FOLIC/DHA 28-975-200
1 COMBINATION PACKAGE (EA) ORAL NIGHTLY
Qty: 28.4 G | Refills: 3 | Status: SHIPPED | OUTPATIENT
Start: 2024-04-30

## 2024-04-30 RX ORDER — LATANOPROST 50 UG/ML
1 SOLUTION/ DROPS OPHTHALMIC NIGHTLY
Qty: 7.5 ML | Refills: 0 | Status: SHIPPED | OUTPATIENT
Start: 2024-04-30

## 2024-04-30 RX ORDER — GLIMEPIRIDE 2 MG/1
2 TABLET ORAL
Qty: 90 TABLET | Refills: 0 | Status: SHIPPED | OUTPATIENT
Start: 2024-04-30

## 2024-04-30 NOTE — TELEPHONE ENCOUNTER
Left [email or web address] message for patient to call and set up ov with Dr. Italia Anderson

## 2024-04-30 NOTE — TELEPHONE ENCOUNTER
Front Office: Patient overdue for follow-up. Please schedule patient for OV before further refills will be given. Due mid April.     Requested Prescriptions     Signed Prescriptions Disp Refills    latanoprost 0.005 % Ophthalmic Solution 7.5 mL 0     Sig: Place 1 drop into both eyes nightly.     Authorizing Provider: YOLI DE User: SAPNA DE JESUS    terbinafine (LAMISIL AT) 1 % External Cream 28.4 g 3     Sig: Apply 1 Application topically at bedtime.     Authorizing Provider: YOLI DE     Ordering User: SAPNA DE JESUS    glimepiride 2 MG Oral Tab 90 tablet 0     Sig: Take 1 tablet (2 mg total) by mouth daily with breakfast.     Authorizing Provider: YOLI DE User: SAPNA DE JESUS    Tirzepatide (MOUNJARO) 5 MG/0.5ML Subcutaneous Solution Pen-injector 6 mL 0     Sig: Inject 5 mg into the skin once a week.     Authorizing Provider: YOLI DE User: SAPNA DE JESUS     Refilled per protocol/OV notes

## 2024-05-08 NOTE — PROGRESS NOTES
This is a 34 year old male who presents with the following symptoms, risk factors, behaviors or other items associated with sleep problems.    Sleep Apnea:   snoring; stops breathing; wakes with dry mouth; mouth breathing; restless sleep; overweight; diabetes  Insomnia:  No data recorded  Restless Leg:  no symptoms or restless legs  Parasomnias:   no symptoms of parasomnias  Daytime Problems:  sleepiness    The patient's [place] Sleepiness score is 13/24.    YASMIN LOMAS  2024 - 2024  Patient ID: [id number]  : 1990  Age: 34 years  [address]  [place]  [address] [facility], [address]  Compliance Report  Usage 2024 - 2024  Usage days 65/65 days (100%)  >= 4 hours 60 days (92%)  < 4 hours 5 days (8%)  Usage hours 454 hours 19 minutes  Average usage (total days) 6 hours 59 minutes  Average usage (days used) 6 hours 59 minutes  Median usage (days used) 7 hours 7 minutes  Total used hours (value since last reset - 2024) 474 hours  AirCurve 10 VAuto  Serial number [id number]  Mode VAuto  Max IPAP 24 cmH2O  Min EPAP 20 cmH2O  Pressure Support 4 cmH2O  Therapy  Leaks - L/min Median: 1.5 95th percentile: 14.1 Maximum: 47.5  Events per hour AI: 0.7 HI: 0.1 AHI: 0.8  Apnea Index Central: 0.1 Obstructive: 0.4 Unknown: 0.2

## 2024-05-09 ENCOUNTER — OFFICE VISIT (OUTPATIENT)
Facility: CLINIC | Age: 34
End: 2024-05-09
Payer: COMMERCIAL

## 2024-05-09 VITALS
HEART RATE: 88 BPM | RESPIRATION RATE: 18 BRPM | OXYGEN SATURATION: 97 % | BODY MASS INDEX: 45.77 KG/M2 | SYSTOLIC BLOOD PRESSURE: 136 MMHG | HEIGHT: 68 IN | TEMPERATURE: 98 F | WEIGHT: 302 LBS | DIASTOLIC BLOOD PRESSURE: 90 MMHG

## 2024-05-09 DIAGNOSIS — G47.33 OSA (OBSTRUCTIVE SLEEP APNEA): Primary | ICD-10-CM

## 2024-05-09 DIAGNOSIS — E66.01 OBESITY, MORBID (HCC): ICD-10-CM

## 2024-05-09 DIAGNOSIS — G47.19 DAYTIME HYPERSOMNOLENCE: ICD-10-CM

## 2024-05-09 PROCEDURE — 99214 OFFICE O/P EST MOD 30 MIN: CPT | Performed by: INTERNAL MEDICINE

## 2024-05-09 NOTE — PROGRESS NOTES
Pulmonary/Critical Care/Sleep Medicine    Progress Note     PCP: Italia Anderson DO   Phone: [phone]   Fax: [phone]          Chief Complaint   Patient presents with    Obstructive Sleep Apnea (JAIRO)     Dme:   Mask: FFM - small       HPI  I had the pleasure of seeing Valdez Ellis who is a pleasant 34 year old male who presents for follow up of JAIRO after 2/2/2024       The patient reports using auto biPAP daily at night at Max IPAP 24 cwp PS 4 cwp and EPAP 20 cwp  cmH2O regularly throughout the night for about 7 hours and states that the  PAP machine is quiet and has a heated humidifier.       He states bed time around 10 PM . It takes few min  to fall asleep and leaves bed around  6-7  AM. He wakes up sometimes 3 to 5 times a night.  He is sleepy and fatigued during the daytime. Naps during daytime.  He admits to tossing and turning at night.  He denies nightmares, sleep talking or sleep walking.  He   denies AM headaches.  He denies symptoms sleep attacks,      The patient occasionally kicks legs at night. Denies  teeth grinding.         He drinks  no caffeine       He has pets 2 dogs  that does not sleep in bed.         Hx of tobacco use: He  reports that he has never smoked. He has never been exposed to tobacco smoke. He has never used smokeless tobacco.    Past Medical History:    Essential hypertension    Mixed hyperlipidemia due to type 2 diabetes mellitus (HCC)    Obesity, morbid (HCC)    JAIRO (obstructive sleep apnea)    Uncontrolled type 2 diabetes mellitus with hyperglycemia (HCC)      No past surgical history on file.  Allergies   Allergen Reactions    Lisinopril OTHER (SEE COMMENTS)     Hives and lip swelling       Current Outpatient Medications   Medication Sig Dispense Refill    latanoprost 0.005 % Ophthalmic Solution Place 1 drop into both eyes nightly. 7.5 mL 0    glimepiride 2 MG Oral Tab Take 1 tablet (2 mg total) by mouth daily with breakfast. 90 tablet 0    Tirzepatide (MOUNJARO) 5  MG/0.5ML Subcutaneous Solution Pen-injector Inject 5 mg into the skin once a week. 6 mL 0    metFORMIN HCl 1000 MG Oral Tab Take 1 tablet (1,000 mg total) by mouth 2 (two) times daily with meals. 60 tablet 0    atorvastatin 20 MG Oral Tab TAKE 1 TABLET (20 MG TOTAL) BY MOUTH NIGHTLY. 30 tablet 0    losartan 100 MG Oral Tab TAKE 1 TABLET (100 MG TOTAL) BY MOUTH DAILY. 30 tablet 0    terbinafine (LAMISIL AT) 1 % External Cream Apply 1 Application topically at bedtime. (Patient not taking: Reported on 5/9/2024) 28.4 g 3    benzonatate 100 MG Oral Cap Take 1 capsule (100 mg total) by mouth 3 (three) times daily as needed for cough. (Patient not taking: Reported on 5/9/2024) 20 capsule 0    Dextroamphetamine Sulfate 10 MG Oral Tab Take 1 tablet (10 mg total) by mouth 3 (three) times daily. (Patient not taking: Reported on 5/9/2024)        Social History     Socioeconomic History    Marital status:    Occupational History    Occupation: [employer]     Employer: [employer]    Tobacco Use    Smoking status: Never     Passive exposure: Never    Smokeless tobacco: Never   Vaping Use    Vaping status: Never Used   Substance and Sexual Activity    Alcohol use: Never    Drug use: Never   Other Topics Concern    Caffeine Concern No    Exercise No    Seat Belt Yes    Special Diet No    Stress Concern No    Weight Concern No     Service No    Blood Transfusions No    Occupational Exposure No    Hobby Hazards No    Sleep Concern No    Back Care No    Bike Helmet No    Self-Exams No     Social Determinants of Health      Received from [facility]      Immunization History   Administered Date(s) Administered    Covid-19 Vaccine Pfizer 30 mcg/0.3 ml 12/23/2021, 01/13/2022    Covid-19 Vaccine Pfizer Bivalent 30mcg/0.3mL 09/19/2022    FLU VAC QIV SPLIT 3 YRS AND OLDER (70226) 11/09/2021    FLULAVAL 6 months & older 0.5 ml Prefilled syringe (33422) 11/02/2022    FLUZONE 6 months and older PFS 0.5 ml (26212)  02/25/2021, 11/02/2022    Pneumococcal Conjugate PCV20 02/02/2023    Pneumovax 23 11/09/2021    TDAP 02/25/2021   Pended Date(s) Pended    Influenza Vaccine Refused 11/16/2023   Deferred Date(s) Deferred    Pneumovax 23 04/08/2021      Family History   Problem Relation Age of Onset    Diabetes Father     Heart Disorder Father         Review of Systems   Constitutional:  Positive for fatigue. Negative for fever and unexpected weight change.   HENT:  Negative for congestion, mouth sores, nosebleeds, postnasal drip, rhinorrhea, sore throat and trouble swallowing.    Eyes:  Negative for visual disturbance.   Respiratory:  Negative for apnea, cough, choking, chest tightness, shortness of breath and wheezing.    Cardiovascular:  Negative for chest pain, palpitations and leg swelling.   Gastrointestinal:  Negative for abdominal pain, constipation, diarrhea, nausea and vomiting.   Genitourinary:  Negative for difficulty urinating.   Musculoskeletal:  Negative for arthralgias, back pain, gait problem and myalgias.   Neurological:  Negative for dizziness, weakness and headaches.   Psychiatric/Behavioral:  Positive for sleep disturbance.         Vitals:    05/09/24 1341   BP: 136/90   Pulse: 88   Resp: 18   Temp: 98.1 °F (36.7 °C)      SpO2: 97 %  Ht Readings from Last 1 Encounters:   05/09/24 5' 8\" (1.727 m)     Wt Readings from Last 1 Encounters:   05/09/24 (!) 302 lb (137 kg)     Body mass index is 45.92 kg/m².     Physical Exam  Constitutional:       General: He is not in acute distress.     Appearance: Normal appearance. He is obese. He is not ill-appearing or diaphoretic.   HENT:      Head: Normocephalic and atraumatic.      Nose: Nose normal. No congestion or rhinorrhea.      Comments: Narrow nares bilaterally      Mouth/Throat:      Mouth: Mucous membranes are moist.      Pharynx: Oropharynx is clear. No oropharyngeal exudate or posterior oropharyngeal erythema.      Comments: Mallampati class IV palate   Eyes:       Extraocular Movements: Extraocular movements intact.      Pupils: Pupils are equal, round, and reactive to light.   Cardiovascular:      Rate and Rhythm: Normal rate.      Pulses: Normal pulses.      Heart sounds: Normal heart sounds. No murmur heard.  Pulmonary:      Effort: Pulmonary effort is normal. No respiratory distress.      Breath sounds: Normal breath sounds. No wheezing or rhonchi.   Chest:      Chest wall: No tenderness.   Abdominal:      General: Abdomen is flat. Bowel sounds are normal.      Palpations: Abdomen is soft.   Musculoskeletal:         General: Normal range of motion.   Skin:     General: Skin is warm.   Neurological:      General: No focal deficit present.      Mental Status: He is alert and oriented to person, place, and time.   Psychiatric:         Mood and Affect: Mood normal.         Behavior: Behavior normal.         Thought Content: Thought content normal.         Judgment: Judgment normal.             Labs:  Last BMP  Lab Results   Component Value Date     (H) 02/02/2023    BUN 10 02/02/2023    CREATSERUM 0.73 02/02/2023    BUNCREA 19.0 08/20/2021    ANIONGAP 4 02/02/2023    CA 9.4 02/02/2023     02/02/2023    K 4.3 02/02/2023     02/02/2023    CO2 31.0 02/02/2023    OSMOCALC 291 02/02/2023      Last CBC  Lab Results   Component Value Date    WBC 10.5 09/22/2022    RBC 4.81 09/22/2022    HGB 13.7 09/22/2022    HCT 43.8 09/22/2022    MCV 91.1 09/22/2022    MCH 28.5 09/22/2022    MCHC 31.3 09/22/2022    RDW 15.9 09/22/2022    .0 09/22/2022      Last CMP  Lab Results   Component Value Date     (H) 02/02/2023    BUN 10 02/02/2023    BUNCREA 19.0 08/20/2021    CREATSERUM 0.73 02/02/2023    ANIONGAP 4 02/02/2023    CA 9.4 02/02/2023    OSMOCALC 291 02/02/2023    ALKPHO 116 02/02/2023    AST 26 02/02/2023    ALT 41 02/02/2023    BILT 0.8 02/02/2023    TP 8.3 (H) 02/02/2023    ALB 3.8 02/02/2023    GLOBULIN 4.5 (H) 02/02/2023     02/02/2023    K 4.3  02/02/2023     02/02/2023    CO2 31.0 02/02/2023      Last Thyroid Function  Lab Results   Component Value Date    TSH 1.630 09/22/2022        Imaging:  None       Sleep study 12/14/2023 Type: Split Night   Procedure: The Polysomnogram was performed with a sleep technologist in attendance at the [facility]. The following parameters were monitored: central, occipital and temporal EEG, EOG, submentalis EMG, nasal flow, nasal pressure, respiratory inductance plethysmography and oxygen saturation via continuous pulse oximetry. Titration of positive airway pressure was also performed during the study, with an additional channel for the measurement of CPAP flow. Sleep stages, periodic limb movements and EEG arousals were scored in accordance with the American Academy of Sleep Medicine Manual for the Scoring of Sleep and Associated Events. Apnea Hypopnea Index was calculated using the recommended definition of hypopnea for scoring respiratory events. Data acquisition, collection and scoring have been validated and clinically correlated.   Sleep History: YASMIN LOMAS is a 33 year old Male referred by YOLI DE ([id number]) for the evaluation of suspected obstructive sleep apnea..   Past Medical History is pertinent for Hypertension, Hyperlipidemia, Diabetes Type II, Obesity, Hyperglycemia.   At the time of the study, the patient was prescribed the following medications: Glimepiride, losartan, atorvastatin, metFORMIN HCI.   Sleep Architecture: During the baseline portion of the study, the total recording time was 127.2, total sleep time was 85.0 and sleep efficiency of 66.8%. The sleep latency was 17.7. Wake after sleep onset was 24.5 minutes. The percentage of total sleep time by sleep stage is as follows: Stage 1 99.4%, Stage 2 0.6%, Stage 3 0.0% and Stage REM 0.0%.   During the CPAP Titration portion of the study, the total recording time was 261.6, total sleep time was 256.5 and sleep  efficiency was 98.1%. The sleep latency was 4.0. Wake after sleep onset was 1.5 minutes. Percentage of total sleep time by sleep stage is as follows: Stage 1 2.5%, Stage 2 36.5%, Stage 3 0.4% and Stage REM 60.6%.   Respiratory Analysis: During the baseline portion of the study, respiratory monitoring revealed an Apnea-Hypopnea Index (AHI) of 172.9, with an overall Respiratory Disturbance Index (RDI) of 172.9 Report printed: 26-Dec-23 BOLORYASMIN Page 2 of 9     events per hour. The REM AHI was -. The supine AHI was 170.6 events per hour. The non-supine related AHI was 174.7 events per hour. The Central Apnea Index was 0. The oxygen janice was 63.0% and the patient spent 78.1% of sleep time with oxygen levels below 88%. Supplemental oxygen was not used during the study.   During the CPAP portion of the study, respiratory monitoring revealed incomplete resolution of obstructive events with CPAP so BiPAP was initiated and at BiPAP of 24/20 cwp cm H2O there was significant yet incomplete resolution of AHI. Supine REM was not observed at this pressure.   Snoring Profile: During the baseline portion of the study, snoring was severe. During CPAP/BiPAP titration, snoring was resolved. markedly   Cardiac Profile: During the baseline portion of the study, Electrocardiogram demonstrated normal sinus rhythm. During the CPAP/BiPAP Titration portion of the study, Electrocardiogram demonstrated normal sinus rhythm.   EEG Profile: Based on the limited recording montage, during the baseline portion of the study there were no significant EEG abnormalities noted. There were 9 spontaneous arousals, with a total arousal index of 156.7. During the CPAP Titration portion of the study there were no significant EEG abnormalities noted. There were 3 spontaneous arousals, with a total arousal index of 5.3.   Periodic Limb Movements: During the baseline portion of the study, the PLM index of 0. PLM arousal index of 0. During the CPAP Titration  portion of the study, the PLM index of 0. PLM arousal index of 0.   IMPRESSIONS: This study reveals obstructive sleep apnea with .9 events per hour and inadequate CPAP titration with persistent events . Although with significant resolution of AHI on BiPAP at 24/20 cwp.   Diagnosis: Obstructive Sleep Apnea G47.33   RECOMMENDATIONS:   1. It is recommended that the patient should be prescribed Bilevel at 24/20 cm H2O, with humidity at (select) and (select).   2. During the titration, the patient was fitted with a [employer] Simplus full face mask, size Medium.   3. The patient should avoid alcohol and sedative medications, as these may worsen severity of symptoms.   4. The patient should avoid drowsy driving.   5. Patient to follow up with a sleep specialist in clinic.     Thank you for allowing me to participate in this patient's care. Please do not hesitate to contact me with any additional questions.   Dictated by: Dr. Link   Electronically signed by Shade Link MD   (Electronic Signature)   Board Certified in Sleep Medicine     [place] Sleepiness Scale: (ESS) score on today's visit is 4  out of 24.     Score total of 1-6    Normal sleep   Score total of 7-8    Average sleepiness   Score total of 9-24    Abnormal (possibly pathologic) sleepiness       Impression:    Obstructive sleep apnea syndrome (OSAS): Split night Attended sleep study performed on 12/14/2023  showed Apnea-Hypopnea Index (AHI) of 172.9 events per hour; with an overall Respiratory Disturbance Index (RDI) of 172.9 events per hour.The supine AHI was 170.6 events per hour. The non-supine related AHI was 174.7 events per hour. The Central Apnea Index was 0.   Nocturnal Hypoxemia: oxygen janice was 63.0% and the patient spent 78.1% of sleep time with oxygen levels below 88%.that apparently resolved on biPAP. Treated with auto biPAP daily at night at Max IPAP 24 cwp PS 4 cwp and EPAP 20 cwp  cmH2O . Download data on 5/7/2024 for   65 days  shows adequate AHI and compliance   Daytime hypersomnolence/fatigue  Obesity: Class III  ;  Body mass index is 45.92 kg/m².  Hypertension  Hyperlipidemia   Type II diabetes Mellitus                                 Plan:      Continue  auto biPAP daily at night at Max IPAP 24 cwp PS 4 cwp and EPAP 20 cwp [employer] Simplus full face mask, size Medium. The patient is using and benefiting form the biPAP machine   Advised about weight loss   Advised against drowsy driving and to avoid alcoholic beverage and respiratory depressants as these may worsen sleep apnea       Follow up:  8 months     Thank you for allowing me to participate in your patient care.    REINA Link MD, FACP, FCCP, FAASM - Pulmonary/Critical care/Sleep Medicine  Please contact our office if you have any questions or concerns at [phone]

## 2024-05-09 NOTE — PATIENT INSTRUCTIONS
Plan:      Continue  auto biPAP daily at night at Max IPAP 24 cwp PS 4 cwp and EPAP 20 cwp [employer] Simplus full face mask, size Medium. The patient is using and benefiting form the biPAP machine   Advised about weight loss   Advised against drowsy driving and to avoid alcoholic beverage and respiratory depressants as these may worsen sleep apnea       Follow up:  8 months     Shade Link MD      Obstructive Sleep Apnea  Obstructive sleep apnea is a condition caused by air passages becoming narrowed or blocked during sleep. As a result, breathing stops for short periods. Your body wakes up enough for breathing to start again. But you don't remember it. The cycle of stopped breathing and brief awakenings can repeat dozens of times a night. This prevents the body from getting to the deeper stages of sleep that are needed for good rest.   Signs of sleep apnea include loud snoring, noisy breathing, and gasping sounds during sleep. People with sleep apnea often find they use the bathroom many times during the night. Daytime symptoms include waking up tired after a full night's sleep and waking up with headaches. They can also include feeling very sleepy or falling asleep during the day, and having problems with memory or concentration.   Risk factors for sleep apnea include:  Being overweight  Being assigned male at birth, or being in menopause  Smoking  Using alcohol or sedating medicines  Having enlarged structures in the nose or throat such as enlarged tonsils or adenoids, or extra tissue in the airway  Home care  Lifestyle changes that can help treat snoring and sleep apnea include:   If you're overweight, talk with your healthcare provider about a weight-loss plan for you.  Don't drink alcohol for 3 to 4 hours before bedtime.  Don't take sedating medicines. Ask your healthcare provider about the medicines you take.  If you smoke, talk to your provider about ways to quit. It's important to stay away from  secondhand smoke. Don't use e-cigarettes because of their harmful side effects.  Sleep on your side. This can help prevent gravity from pulling relaxed throat tissues into your breathing passages.  If you have allergies or sinus problems that block your nose, ask your provider for help.  Use positive airway pressure (PAP). Discuss with your provider the benefits of using PAP at home. And talk about the type of PAP that's best for you.  Follow-up care  Follow up with your healthcare provider, or as advised. A diagnosis of sleep apnea is made with a sleep study. Your provider can tell you more about this test.   When to get medical care  See your healthcare provider if you have daytime symptoms of sleep apnea. These include:   Waking up tired after a full night's sleep  Waking up with a headache  Feeling very sleepy or falling asleep during the day  Having problems with memory or concentration  Also talk with your provider if your partner tells you that you snore, gasp for air, or stop breathing while you sleep.   Seeing your provider is important because sleep apnea can make you more likely to have certain health problems. These include high blood pressure, heart attack, stroke, and sexual dysfunction. If you have sleep apnea, talk with your healthcare provider about the best treatments for you.   [employer] last reviewed this educational content on 5/1/2022 © 2000-2023 [employer]. All rights reserved. This information is not intended as a substitute for professional medical care. Always follow your healthcare professional's instructions.        Continuous Positive Air Pressure (CPAP)     A mask over the nose gently directs air into the throat to keep the airway open.     Continuous positive air pressure (CPAP) uses gentle air pressure to hold the airway open. CPAP is often the most effective treatment for sleep apnea. It works very well as a treatment for adults diagnosed with obstructive sleep apnea  with a lot of sleepiness. But keep in mind that it can take several adjustments before the setup is right for you.   How CPAP works  The CPAP machine  is a small portable pump that sits beside the bed. The pump sends air through a hose, which is held over your nose alone, or nose and mouth by a mask. Mild air pressure is gently pushed through your airway. The air pressure nudges sagging tissues aside. This widens the airway so you can breathe better. CPAP may be combined with other kinds of therapy for sleep apnea.   Types of air pressure treatments  There are different types of CPAP. Your doctor or CPAP technician will help you decide which type is best for you:   Basic CPAP keeps the pressure constant all night long.  A bilevel device (BiPAP) provides more pressure when you breathe in and less when you breathe out. A BiPAP machine also may be set to provide automatic breaths to maintain breathing if you stop breathing while sleeping.  An autoCPAP device automatically adjusts pressure throughout the night and in response to changes such as body position, sleep stage, and snoring.  [employer] last reviewed this educational content on 7/1/2019  © 2038-2837 [employer]. All rights reserved. This information is not intended as a substitute for professional medical care. Always follow your healthcare professional's instructions.

## 2024-05-21 DIAGNOSIS — I10 ESSENTIAL HYPERTENSION: ICD-10-CM

## 2024-05-21 DIAGNOSIS — R80.9 MICROALBUMINURIA: ICD-10-CM

## 2024-05-21 RX ORDER — LOSARTAN POTASSIUM 100 MG/1
100 TABLET ORAL DAILY
Qty: 30 TABLET | Refills: 0 | Status: SHIPPED | OUTPATIENT
Start: 2024-05-21 | End: 2024-05-30

## 2024-05-21 NOTE — TELEPHONE ENCOUNTER
Front Office: Patient overdue for follow-up. Please schedule patient for OV before further refills will be given.    Requested Prescriptions     Signed Prescriptions Disp Refills    losartan 100 MG Oral Tab 30 tablet 0     Sig: Take 1 tablet (100 mg total) by mouth daily.     Authorizing Provider: YOLI DE     Ordering User: SAPNA DE JESUS     Refruy per protocol/OV notes  \"Patient understands plan and follow-up.  FU in 3 months for recheck on DM, HTN and weight\"

## 2024-05-30 DIAGNOSIS — E11.69 MIXED HYPERLIPIDEMIA DUE TO TYPE 2 DIABETES MELLITUS (HCC): ICD-10-CM

## 2024-05-30 DIAGNOSIS — E78.2 MIXED HYPERLIPIDEMIA DUE TO TYPE 2 DIABETES MELLITUS (HCC): ICD-10-CM

## 2024-05-30 DIAGNOSIS — E11.65 UNCONTROLLED TYPE 2 DIABETES MELLITUS WITH HYPERGLYCEMIA (HCC): ICD-10-CM

## 2024-05-30 DIAGNOSIS — I10 ESSENTIAL HYPERTENSION: ICD-10-CM

## 2024-05-30 DIAGNOSIS — R80.9 MICROALBUMINURIA: ICD-10-CM

## 2024-05-30 RX ORDER — LOSARTAN POTASSIUM 100 MG/1
100 TABLET ORAL DAILY
Qty: 30 TABLET | Refills: 0 | Status: SHIPPED | OUTPATIENT
Start: 2024-05-30

## 2024-05-30 RX ORDER — ATORVASTATIN CALCIUM 20 MG/1
20 TABLET, FILM COATED ORAL NIGHTLY
Qty: 30 TABLET | Refills: 0 | Status: SHIPPED | OUTPATIENT
Start: 2024-05-30

## 2024-05-30 NOTE — TELEPHONE ENCOUNTER
Pended refill requests for metformin, atorvastatin, and losartan.   LOV 1/18/24, overdue for labs

## 2024-05-30 NOTE — TELEPHONE ENCOUNTER
Please schedule an appointment. Patient is due for recheck on his blood pressure. 1 month of refills given. No more after that until seen.

## 2024-06-19 DIAGNOSIS — R80.9 MICROALBUMINURIA DUE TO TYPE 2 DIABETES MELLITUS (HCC): ICD-10-CM

## 2024-06-19 DIAGNOSIS — E11.65 UNCONTROLLED TYPE 2 DIABETES MELLITUS WITH HYPERGLYCEMIA (HCC): ICD-10-CM

## 2024-06-19 DIAGNOSIS — E78.2 MIXED HYPERLIPIDEMIA DUE TO TYPE 2 DIABETES MELLITUS (HCC): ICD-10-CM

## 2024-06-19 DIAGNOSIS — E11.29 MICROALBUMINURIA DUE TO TYPE 2 DIABETES MELLITUS (HCC): ICD-10-CM

## 2024-06-19 DIAGNOSIS — E11.69 MIXED HYPERLIPIDEMIA DUE TO TYPE 2 DIABETES MELLITUS (HCC): ICD-10-CM

## 2024-06-19 DIAGNOSIS — I10 ESSENTIAL HYPERTENSION: ICD-10-CM

## 2024-06-19 DIAGNOSIS — R80.9 MICROALBUMINURIA: ICD-10-CM

## 2024-06-19 DIAGNOSIS — B35.3 TINEA PEDIS OF LEFT FOOT: ICD-10-CM

## 2024-06-19 DIAGNOSIS — E66.01 OBESITY, MORBID (HCC): ICD-10-CM

## 2024-06-20 RX ORDER — TIRZEPATIDE 5 MG/.5ML
5 INJECTION, SOLUTION SUBCUTANEOUS WEEKLY
Qty: 6 ML | Refills: 0 | OUTPATIENT
Start: 2024-06-20

## 2024-06-20 RX ORDER — PRENATAL VIT 91/IRON/FOLIC/DHA 28-975-200
1 COMBINATION PACKAGE (EA) ORAL NIGHTLY
Qty: 28.4 G | Refills: 3 | OUTPATIENT
Start: 2024-06-20

## 2024-06-20 RX ORDER — ATORVASTATIN CALCIUM 20 MG/1
20 TABLET, FILM COATED ORAL NIGHTLY
Qty: 30 TABLET | Refills: 0 | OUTPATIENT
Start: 2024-06-20

## 2024-06-20 RX ORDER — LATANOPROST 50 UG/ML
1 SOLUTION/ DROPS OPHTHALMIC NIGHTLY
Qty: 7.5 ML | Refills: 0 | OUTPATIENT
Start: 2024-06-20

## 2024-06-20 RX ORDER — GLIMEPIRIDE 2 MG/1
2 TABLET ORAL
Qty: 90 TABLET | Refills: 0 | OUTPATIENT
Start: 2024-06-20

## 2024-06-20 RX ORDER — LOSARTAN POTASSIUM 100 MG/1
100 TABLET ORAL DAILY
Qty: 30 TABLET | Refills: 0 | OUTPATIENT
Start: 2024-06-20

## 2024-06-20 NOTE — TELEPHONE ENCOUNTER
Italia Anderson,          5/30/24  4:18 PM  Note      Please schedule an appointment. Patient is due for recheck on his blood pressure. 1 month of refills given. No more after that until seen.          Francesca Hopkins   to Valdez MCCONNELL      6/3/24  8:58 AM  Good Morning Valdez,      I am reaching out to you to let you know that Dr. Anderson has refilled your prescriptions for 1 month. She will need you to see you in the office before any additional medications will be filled. If you can please give us a call at [phone] to schedule or you can set up appointment via [employer].     Thank you and have a good day,      Francesca ELIAS  [facility]    Last read by Valdez Ellis at  9:22 PM on 6/19/2024.

## 2024-07-10 ENCOUNTER — TELEPHONE (OUTPATIENT)
Dept: FAMILY MEDICINE CLINIC | Facility: CLINIC | Age: 34
End: 2024-07-10

## 2024-07-10 DIAGNOSIS — I10 ESSENTIAL HYPERTENSION: ICD-10-CM

## 2024-07-10 DIAGNOSIS — E11.65 UNCONTROLLED TYPE 2 DIABETES MELLITUS WITH HYPERGLYCEMIA (HCC): ICD-10-CM

## 2024-07-10 DIAGNOSIS — E11.69 MIXED HYPERLIPIDEMIA DUE TO TYPE 2 DIABETES MELLITUS (HCC): ICD-10-CM

## 2024-07-10 DIAGNOSIS — E78.2 MIXED HYPERLIPIDEMIA DUE TO TYPE 2 DIABETES MELLITUS (HCC): ICD-10-CM

## 2024-07-10 DIAGNOSIS — R80.9 MICROALBUMINURIA: ICD-10-CM

## 2024-07-10 RX ORDER — GLIMEPIRIDE 2 MG/1
2 TABLET ORAL
Qty: 30 TABLET | Refills: 0 | Status: SHIPPED | OUTPATIENT
Start: 2024-07-10

## 2024-07-10 RX ORDER — LOSARTAN POTASSIUM 100 MG/1
100 TABLET ORAL DAILY
Qty: 30 TABLET | Refills: 0 | Status: SHIPPED | OUTPATIENT
Start: 2024-07-10

## 2024-07-10 RX ORDER — ATORVASTATIN CALCIUM 20 MG/1
20 TABLET, FILM COATED ORAL NIGHTLY
Qty: 30 TABLET | Refills: 0 | Status: SHIPPED | OUTPATIENT
Start: 2024-07-10

## 2024-07-10 NOTE — TELEPHONE ENCOUNTER
Petar Ellis requesting medication refill for  metFORMIN HCl 1000 MG Oral Tab   atorvastatin 20 MG Oral Tab   losartan 100 MG Oral Tab   glimepiride 2 MG Oral Tab         LOV: 1/18/2024   Last Refill Date: 5/30/24  30 or 90 Day Supply:  Pharmacy Location: [facility]  Prescribed By:        Informed patient to allow up to 24 to 48 Business hours for a call back from a nurse.

## 2024-08-01 ENCOUNTER — OFFICE VISIT (OUTPATIENT)
Dept: FAMILY MEDICINE CLINIC | Facility: CLINIC | Age: 34
End: 2024-08-01
Payer: COMMERCIAL

## 2024-08-01 DIAGNOSIS — I10 ESSENTIAL HYPERTENSION: Primary | ICD-10-CM

## 2024-08-01 NOTE — PROGRESS NOTES
Patient rescheduled appointment to next month for his physical. He was reminded to get his blood tests done before the physical.

## 2024-08-11 DIAGNOSIS — E11.65 UNCONTROLLED TYPE 2 DIABETES MELLITUS WITH HYPERGLYCEMIA (HCC): ICD-10-CM

## 2024-08-11 DIAGNOSIS — R80.9 MICROALBUMINURIA DUE TO TYPE 2 DIABETES MELLITUS (HCC): ICD-10-CM

## 2024-08-11 DIAGNOSIS — E66.01 OBESITY, MORBID (HCC): ICD-10-CM

## 2024-08-11 DIAGNOSIS — I10 ESSENTIAL HYPERTENSION: ICD-10-CM

## 2024-08-11 DIAGNOSIS — E11.29 MICROALBUMINURIA DUE TO TYPE 2 DIABETES MELLITUS (HCC): ICD-10-CM

## 2024-08-11 DIAGNOSIS — R80.9 MICROALBUMINURIA: ICD-10-CM

## 2024-08-12 RX ORDER — GLIMEPIRIDE 2 MG/1
2 TABLET ORAL
Qty: 30 TABLET | Refills: 0 | Status: SHIPPED | OUTPATIENT
Start: 2024-08-12

## 2024-08-12 RX ORDER — LOSARTAN POTASSIUM 100 MG/1
100 TABLET ORAL DAILY
Qty: 30 TABLET | Refills: 0 | Status: SHIPPED | OUTPATIENT
Start: 2024-08-12

## 2024-08-12 RX ORDER — TIRZEPATIDE 5 MG/.5ML
5 INJECTION, SOLUTION SUBCUTANEOUS WEEKLY
Qty: 6 ML | Refills: 0 | Status: SHIPPED | OUTPATIENT
Start: 2024-08-12

## 2024-08-19 ENCOUNTER — LAB ENCOUNTER (OUTPATIENT)
Dept: LAB | Age: 34
End: 2024-08-19
Attending: FAMILY MEDICINE
Payer: COMMERCIAL

## 2024-08-19 DIAGNOSIS — Z13.0 SCREENING, ANEMIA, DEFICIENCY, IRON: ICD-10-CM

## 2024-08-19 DIAGNOSIS — R80.9 MICROALBUMINURIA: ICD-10-CM

## 2024-08-19 DIAGNOSIS — E78.2 MIXED HYPERLIPIDEMIA DUE TO TYPE 2 DIABETES MELLITUS (HCC): ICD-10-CM

## 2024-08-19 DIAGNOSIS — Z13.29 SCREENING FOR THYROID DISORDER: ICD-10-CM

## 2024-08-19 DIAGNOSIS — E11.65 UNCONTROLLED TYPE 2 DIABETES MELLITUS WITH HYPERGLYCEMIA (HCC): ICD-10-CM

## 2024-08-19 DIAGNOSIS — E11.69 MIXED HYPERLIPIDEMIA DUE TO TYPE 2 DIABETES MELLITUS (HCC): ICD-10-CM

## 2024-08-19 LAB
ALBUMIN SERPL-MCNC: 5 G/DL (ref 3.2–4.8)
ALBUMIN/GLOB SERPL: 1.5 {RATIO} (ref 1–2)
ALP LIVER SERPL-CCNC: 106 U/L
ALT SERPL-CCNC: 75 U/L
ANION GAP SERPL CALC-SCNC: 7 MMOL/L (ref 0–18)
AST SERPL-CCNC: 65 U/L (ref ?–34)
BASOPHILS # BLD AUTO: 0.05 X10(3) UL (ref 0–0.2)
BASOPHILS NFR BLD AUTO: 0.5 %
BILIRUB SERPL-MCNC: 1.2 MG/DL (ref 0.3–1.2)
BUN BLD-MCNC: 8 MG/DL (ref 9–23)
CALCIUM BLD-MCNC: 10 MG/DL (ref 8.7–10.4)
CHLORIDE SERPL-SCNC: 101 MMOL/L (ref 98–112)
CHOLEST SERPL-MCNC: 194 MG/DL (ref ?–200)
CO2 SERPL-SCNC: 30 MMOL/L (ref 21–32)
CREAT BLD-MCNC: 0.71 MG/DL
CREAT UR-SCNC: 366 MG/DL
EGFRCR SERPLBLD CKD-EPI 2021: 125 ML/MIN/1.73M2 (ref 60–?)
EOSINOPHIL # BLD AUTO: 0.34 X10(3) UL (ref 0–0.7)
EOSINOPHIL NFR BLD AUTO: 3.3 %
ERYTHROCYTE [DISTWIDTH] IN BLOOD BY AUTOMATED COUNT: 13.6 %
EST. AVERAGE GLUCOSE BLD GHB EST-MCNC: 131 MG/DL (ref 68–126)
FASTING PATIENT LIPID ANSWER: YES
FASTING STATUS PATIENT QL REPORTED: YES
GLOBULIN PLAS-MCNC: 3.4 G/DL (ref 2–3.5)
GLUCOSE BLD-MCNC: 114 MG/DL (ref 70–99)
HBA1C MFR BLD: 6.2 % (ref ?–5.7)
HCT VFR BLD AUTO: 42.6 %
HDLC SERPL-MCNC: 61 MG/DL (ref 40–59)
HGB BLD-MCNC: 14 G/DL
IMM GRANULOCYTES # BLD AUTO: 0.06 X10(3) UL (ref 0–1)
IMM GRANULOCYTES NFR BLD: 0.6 %
LDLC SERPL CALC-MCNC: 110 MG/DL (ref ?–100)
LYMPHOCYTES # BLD AUTO: 2.02 X10(3) UL (ref 1–4)
LYMPHOCYTES NFR BLD AUTO: 19.8 %
MCH RBC QN AUTO: 32.2 PG (ref 26–34)
MCHC RBC AUTO-ENTMCNC: 32.9 G/DL (ref 31–37)
MCV RBC AUTO: 97.9 FL
MICROALBUMIN UR-MCNC: 10.2 MG/DL
MICROALBUMIN/CREAT 24H UR-RTO: 27.9 UG/MG (ref ?–30)
MONOCYTES # BLD AUTO: 0.9 X10(3) UL (ref 0.1–1)
MONOCYTES NFR BLD AUTO: 8.8 %
NEUTROPHILS # BLD AUTO: 6.82 X10 (3) UL (ref 1.5–7.7)
NEUTROPHILS # BLD AUTO: 6.82 X10(3) UL (ref 1.5–7.7)
NEUTROPHILS NFR BLD AUTO: 67 %
NONHDLC SERPL-MCNC: 133 MG/DL (ref ?–130)
OSMOLALITY SERPL CALC.SUM OF ELEC: 285 MOSM/KG (ref 275–295)
PLATELET # BLD AUTO: 195 10(3)UL (ref 150–450)
POTASSIUM SERPL-SCNC: 3.7 MMOL/L (ref 3.5–5.1)
PROT SERPL-MCNC: 8.4 G/DL (ref 5.7–8.2)
RBC # BLD AUTO: 4.35 X10(6)UL
SODIUM SERPL-SCNC: 138 MMOL/L (ref 136–145)
TRIGL SERPL-MCNC: 128 MG/DL (ref 30–149)
TSI SER-ACNC: 2.93 MIU/ML (ref 0.55–4.78)
VLDLC SERPL CALC-MCNC: 22 MG/DL (ref 0–30)
WBC # BLD AUTO: 10.2 X10(3) UL (ref 4–11)

## 2024-08-19 PROCEDURE — 84443 ASSAY THYROID STIM HORMONE: CPT

## 2024-08-19 PROCEDURE — 83036 HEMOGLOBIN GLYCOSYLATED A1C: CPT

## 2024-08-19 PROCEDURE — 36415 COLL VENOUS BLD VENIPUNCTURE: CPT

## 2024-08-19 PROCEDURE — 82570 ASSAY OF URINE CREATININE: CPT

## 2024-08-19 PROCEDURE — 80053 COMPREHEN METABOLIC PANEL: CPT

## 2024-08-19 PROCEDURE — 80061 LIPID PANEL: CPT

## 2024-08-19 PROCEDURE — 85025 COMPLETE CBC W/AUTO DIFF WBC: CPT

## 2024-08-19 PROCEDURE — 82043 UR ALBUMIN QUANTITATIVE: CPT

## 2024-08-22 ENCOUNTER — OFFICE VISIT (OUTPATIENT)
Dept: FAMILY MEDICINE CLINIC | Facility: CLINIC | Age: 34
End: 2024-08-22
Payer: COMMERCIAL

## 2024-08-22 VITALS
HEART RATE: 112 BPM | WEIGHT: 274 LBS | TEMPERATURE: 97 F | OXYGEN SATURATION: 98 % | RESPIRATION RATE: 14 BRPM | BODY MASS INDEX: 41.52 KG/M2 | HEIGHT: 68 IN | DIASTOLIC BLOOD PRESSURE: 84 MMHG | SYSTOLIC BLOOD PRESSURE: 132 MMHG

## 2024-08-22 DIAGNOSIS — I10 ESSENTIAL HYPERTENSION: ICD-10-CM

## 2024-08-22 DIAGNOSIS — E11.69 MIXED HYPERLIPIDEMIA DUE TO TYPE 2 DIABETES MELLITUS (HCC): ICD-10-CM

## 2024-08-22 DIAGNOSIS — G47.33 OSA (OBSTRUCTIVE SLEEP APNEA): ICD-10-CM

## 2024-08-22 DIAGNOSIS — E66.01 OBESITY, MORBID (HCC): ICD-10-CM

## 2024-08-22 DIAGNOSIS — R79.89 ELEVATED LFTS: ICD-10-CM

## 2024-08-22 DIAGNOSIS — Z00.00 WELLNESS EXAMINATION: Primary | ICD-10-CM

## 2024-08-22 DIAGNOSIS — E11.9 TYPE 2 DIABETES MELLITUS WITHOUT COMPLICATION, WITHOUT LONG-TERM CURRENT USE OF INSULIN (HCC): ICD-10-CM

## 2024-08-22 DIAGNOSIS — E78.2 MIXED HYPERLIPIDEMIA DUE TO TYPE 2 DIABETES MELLITUS (HCC): ICD-10-CM

## 2024-08-22 PROBLEM — G47.19 DAYTIME HYPERSOMNOLENCE: Status: RESOLVED | Noted: 2024-02-02 | Resolved: 2024-08-22

## 2024-08-22 PROBLEM — E11.29 MICROALBUMINURIA DUE TO TYPE 2 DIABETES MELLITUS (HCC): Status: RESOLVED | Noted: 2023-12-14 | Resolved: 2024-08-22

## 2024-08-22 PROBLEM — R80.9 MICROALBUMINURIA DUE TO TYPE 2 DIABETES MELLITUS (HCC): Status: RESOLVED | Noted: 2023-12-14 | Resolved: 2024-08-22

## 2024-08-22 PROCEDURE — 99395 PREV VISIT EST AGE 18-39: CPT | Performed by: FAMILY MEDICINE

## 2024-08-22 PROCEDURE — 99214 OFFICE O/P EST MOD 30 MIN: CPT | Performed by: FAMILY MEDICINE

## 2024-08-22 RX ORDER — TIRZEPATIDE 5 MG/.5ML
5 INJECTION, SOLUTION SUBCUTANEOUS WEEKLY
Qty: 6 ML | Refills: 1 | Status: SHIPPED | OUTPATIENT
Start: 2024-08-22

## 2024-08-22 RX ORDER — GLIMEPIRIDE 2 MG/1
2 TABLET ORAL
Qty: 90 TABLET | Refills: 1 | Status: SHIPPED | OUTPATIENT
Start: 2024-08-22

## 2024-08-22 RX ORDER — ATORVASTATIN CALCIUM 20 MG/1
20 TABLET, FILM COATED ORAL NIGHTLY
Qty: 90 TABLET | Refills: 3 | Status: SHIPPED | OUTPATIENT
Start: 2024-08-22

## 2024-08-22 RX ORDER — LOSARTAN POTASSIUM 100 MG/1
100 TABLET ORAL DAILY
Qty: 90 TABLET | Refills: 1 | Status: SHIPPED | OUTPATIENT
Start: 2024-08-22

## 2024-08-22 NOTE — PROGRESS NOTES
The 21st Century Cures Act makes medical notes like these available to patients in the interest of transparency. Please be advised this is a medical document. Medical documents are intended to carry relevant information, facts as evident, and the clinical opinion of the practitioner. The medical note is intended as peer to peer communication and may appear blunt or direct. It is written in medical language and may contain abbreviations or verbiage that are unfamiliar.     Petar Ellis is a 34 year old male who is here for   Chief Complaint   Patient presents with    Physical       HPI:     This 34-year-old male presents to the office for his annual wellness exam and follow-up on his multiple medical problems.    The patient has hypertension and is currently taking losartan 100 mg daily.  He denies any chest pain, palpitations, dizziness, syncope, shortness of breath or leg swelling.    The patient is tolerating his Mounjaro 5 mg injected once weekly for his diabetes.  He denies any issues with constipation, nausea, vomiting or abdominal pain.  He is still taking his metformin 1000 mg twice daily and his glimepiride 2 mg daily.  The patient has not been checking his blood sugars at home.  He had his last eye exam in January 2024.  The patient states he has been better about watching his diet.  He has been trying to walk daily and has been losing weight.    The patient ran out of his atorvastatin for several weeks prior to his lab draw.  He needs a refill on his medication.  He had his laboratory done on 8/19 and is here for discussion of his test results.    The patient is feeling much better since he has been using his CPAP consistently.  The daytime hypersomnolence has improved.  He states he is resting better and is getting about 8 hours of sleep nightly.      Exercise: walking daily  Diet: watches somewhat  Sleep: 8 hours, using CPAP     Depression/Anxiety:  PHQ-2 SCORE: 0  , done 8/22/2024   If you checked  off any problems, how difficult have these problems made it for you to do your work, take care of things at home, or get along with other people?: Not difficult at all    Last [place] Suicide Screening on 8/22/2024 was No Risk.       Fall Risk: No falls last 3 months  Gun in home:No            Glasses/contacts:No             Recent eye doctor visit:1/2/2024   Hearing aids:No    Family History for:  Testicular CA - No   Prostate CA - No                               Colon CA - No    Colonoscopy: Never      Past Medical History:    Attention deficit hyperactivity disorder (ADHD)    Essential hypertension    Low testosterone in male    Mixed hyperlipidemia due to type 2 diabetes mellitus (HCC)    Obesity, morbid (HCC)    JAIRO (obstructive sleep apnea)    Type 2 diabetes mellitus without complication, without long-term current use of insulin (HCC)       History reviewed. No pertinent surgical history.    Family History   Problem Relation Age of Onset    Diabetes Father     Heart Disorder Father        Social History     Socioeconomic History    Marital status:    Occupational History    Occupation: [employer]     Employer: [employer]    Tobacco Use    Smoking status: Never     Passive exposure: Never    Smokeless tobacco: Never   Vaping Use    Vaping status: Never Used   Substance and Sexual Activity    Alcohol use: Yes     Comment: rarely    Drug use: Never   Other Topics Concern    Caffeine Concern No    Exercise No    Seat Belt Yes    Special Diet No    Stress Concern No    Weight Concern No     Service No    Blood Transfusions No    Occupational Exposure No    Hobby Hazards No    Sleep Concern No    Back Care No    Bike Helmet No    Self-Exams No     Works as [employer]    Current Outpatient Medications on File Prior to Visit   Medication Sig Dispense Refill    latanoprost 0.005 % Ophthalmic Solution Place 1 drop into both eyes nightly. 7.5 mL 0    terbinafine (LAMISIL AT) 1 % External Cream Apply 1  Application topically at bedtime. (Patient not taking: Reported on 5/9/2024) 28.4 g 3     No current facility-administered medications on file prior to visit.       Allergies   Allergen Reactions    Lisinopril OTHER (SEE COMMENTS)     Hives and lip swelling         REVIEW OF SYSTEMS:     See HPI for relevant ROS  GENERAL HEALTH: no other complaints  NEURO: no other complaints  VISION: no other complaints  RESPIRATORY: no other complaints  CARDIOVASCULAR: no other complaints  GI: no other complaints  : no other complaints  SKIN: no other complaints  PSYCH: no other complaints  EXT: no other complaints    EXAM:   /84 (BP Location: Right arm, Patient Position: Sitting, Cuff Size: large)   Pulse 112   Temp 97.2 °F (36.2 °C) (Temporal)   Resp 14   Ht 5' 8\" (1.727 m)   Wt 274 lb (124.3 kg)   SpO2 98%   BMI 41.66 kg/m²     Wt Readings from Last 3 Encounters:   08/22/24 274 lb (124.3 kg)   05/09/24 (!) 302 lb (137 kg)   02/27/24 300 lb (136.1 kg)     BP Readings from Last 3 Encounters:   08/22/24 132/84   05/09/24 136/90   02/27/24 (!) 182/114          Visual Acuity  Right Eye Visual Acuity: Uncorrected Right Eye Chart Acuity: 20/25   Left Eye Visual Acuity: Uncorrected Left Eye Chart Acuity: 20/25   Both Eyes Visual Acuity: Uncorrected Both Eyes Chart Acuity: 20/20   Able To Tolerate Visual Acuity: Yes      Weight is down 28 # from 5/9/24    General: WH/morbidly obese/WD, in NAD, A and O times 3  HEAD: Normocephalic, atraumatic  EYES: MIAN, EOMI, conjunctiva normal, sclera anicteric  EARS: Tympanic membranes normal, EAC's normal.  NOSE: Turbninates normal, no bleeding noted.  PHARYNX:  No eythema or exudates, mucous membrane moist, airway patent.  NECK:  No cervical lymphadenopathy or thyromegaly, normal ROM, no bruits noted.  HEART: Regular rate and rhythm, no S3, S4 or murmur noted.  LUNGS: Clear to ausculation. No retractions or tachypnea noted.  ABDOMEN: Soft, obese, nontender, no guarding, rigidity or  rebound, no masses or hepatosplenomegaly, normal bowel sounds in all four quadrants.  : defer.  BACK: No tenderness over the thoracic or lumbar spine. No scoliosis noted.  EXTREMITIES: No clubbing, cyanosis, edema noted. Motor strength +5/5 in all 4 extremities. DTR's +2/4 in all 4 extremities.  SKIN: Warm and dry.  NEURO: Cr. N. II-XII intact, normal gait  PSYCH: Mood and affect are normal.        ASSESSMENT AND PLAN:     1. Wellness examination  -We discussed the following:  Healthy diet and exercise, immunizations, cancer screening.  Patient was reminded to get his flu shot and the new COVID booster at his local pharmacy.    2. Type 2 diabetes mellitus without complication, without long-term current use of insulin (HCC)    Lab Results   Component Value Date    A1C 6.2 (H) 08/19/2024    A1C 7.2 (A) 01/18/2024    A1C 6.5 (A) 08/10/2023    A1C 9.2 (H) 02/02/2023    A1C 7.3 (A) 11/02/2022      I discussed the results of the laboratory with the patient.  The patient's A1c is now within controlled range at 6.2.  Patient's microalbuminuria has resolved.  He is responding very well to the Mounjaro and tolerating it without any difficulty.  I encouraged him to continue to monitor his diet and continue to stay active.  I have refilled his metformin, glimepiride and Mounjaro.  I told the patient at this time, I do not think he needs an increase in the Mounjaro dose.    - metFORMIN HCl 1000 MG Oral Tab; Take 1 tablet (1,000 mg total) by mouth 2 (two) times daily with meals.  Dispense: 180 tablet; Refill: 1  - glimepiride 2 MG Oral Tab; Take 1 tablet (2 mg total) by mouth daily with breakfast.  Dispense: 90 tablet; Refill: 1  - Tirzepatide (MOUNJARO) 5 MG/0.5ML Subcutaneous Solution Pen-injector; Inject 5 mg into the skin once a week.  Dispense: 6 mL; Refill: 1    3. Mixed hyperlipidemia due to type 2 diabetes mellitus (HCC)    Cholesterol:     Lab Results   Component Value Date    CHOLEST 194 08/19/2024    CHOLEST 114  02/02/2023    CHOLEST 219 (H) 10/31/2022     Lab Results   Component Value Date    HDL 61 (H) 08/19/2024    HDL 42 02/02/2023    HDL 47 10/31/2022     Lab Results   Component Value Date    TRIG 128 08/19/2024    TRIG 97 02/02/2023    TRIG 240 (H) 10/31/2022     Lab Results   Component Value Date     (H) 08/19/2024    LDL 54 02/02/2023     (H) 10/31/2022     Lab Results   Component Value Date    AST 65 (H) 08/19/2024    AST 26 02/02/2023     (H) 10/31/2022     Lab Results   Component Value Date    ALT 75 (H) 08/19/2024    ALT 41 02/02/2023     (H) 10/31/2022     The patient's LDL still mildly elevated- however, he had run out of his medication prior to having his labs drawn.  I told him that I would recheck his lipid panel in 6 months.  If his LDL is still elevated at that time, I will increase his atorvastatin to 40 mg daily.  I refilled his atorvastatin.     - atorvastatin 20 MG Oral Tab; Take 1 tablet (20 mg total) by mouth nightly.  Dispense: 90 tablet; Refill: 3    4. Essential hypertension    The patient's blood pressure is controlled on his current medication.  I refilled his losartan 100 mg daily.    - losartan 100 MG Oral Tab; Take 1 tablet (100 mg total) by mouth daily.  Dispense: 90 tablet; Refill: 1    5. Elevated LFTs    Patient's AST and ALT are again mildly elevated.  The patient states he was drinking more due to family get-togethers.  I told him to avoid drinking alcohol and I will recheck his LFTs in 4 weeks.    - Hepatic Function Panel (7) [E]; Future    6. JAIRO (obstructive sleep apnea)    The patient symptoms are well-controlled with his CPAP.    7. Obesity, morbid (HCC)    The patient has lost 28 pounds in the last 3 months with the addition of the Mounjaro.  I encouraged him to continue to stay active and to continue to monitor his diet.         Health maintenance:    Health Maintenance   Topic Date Due    Annual Physical  08/22/2025    COVID-19 Vaccine (4 - 2023-24  season) 12/14/2024 (Originally 9/1/2023)    Influenza Vaccine (1) 10/01/2024    Diabetes Care Dilated Eye Exam  01/02/2025    Diabetes Care Foot Exam  01/18/2025    Diabetes Care A1C  02/19/2025    Diabetes Care: GFR  08/19/2025    Diabetes Care: Microalb/Creat Ratio  08/19/2025    DTaP,Tdap,and Td Vaccines (2 - Td or Tdap) 02/25/2031    Annual Depression Screening  Completed    Pneumococcal Vaccine: Birth to 64yrs  Completed         Orders This Visit:  Orders Placed This Encounter   Procedures    Hepatic Function Panel (7) [E]       Meds This Visit:  Requested Prescriptions     Signed Prescriptions Disp Refills    atorvastatin 20 MG Oral Tab 90 tablet 3     Sig: Take 1 tablet (20 mg total) by mouth nightly.    losartan 100 MG Oral Tab 90 tablet 1     Sig: Take 1 tablet (100 mg total) by mouth daily.    metFORMIN HCl 1000 MG Oral Tab 180 tablet 1     Sig: Take 1 tablet (1,000 mg total) by mouth 2 (two) times daily with meals.    glimepiride 2 MG Oral Tab 90 tablet 1     Sig: Take 1 tablet (2 mg total) by mouth daily with breakfast.    Tirzepatide (MOUNJARO) 5 MG/0.5ML Subcutaneous Solution Pen-injector 6 mL 1     Sig: Inject 5 mg into the skin once a week.       Imaging & Referrals:  None     The patient indicates understanding of these issues and agrees to the plan.  The patient is asked to return in 6 months for recheck on HTN and DM.  Italia Anderson,     I spent a total of 40 minutes including precharting, H&P, plan of care    This dictation was performed with a verbal recognition program (DRAGON) and it was checked for errors. It is possible that there are still dictated errors within this office note. If so, please bring any errors to my attention for an addendum. All efforts were made to ensure that this office note is accurate

## 2024-08-22 NOTE — PATIENT INSTRUCTIONS
Go to the [place] lab in 4 weeks for recheck on your liver function tests. Do not drink any alcohol.    Continue your medications as prescribed.    Continue to watch your diet and stay active. Your diabetes has improved significantly with your medications and weight loss.    For premenopausal women and men, 1000 mg of calcium daily is recommended. For postmenopausal women, 1200 mg of calcium daily is recommended.    To help the body absorb and use calcium, vitamin D 2000 international units daily is recommended.    See me in 6 months. We will recheck your cholesterol panel at that time and if your LDL is still elevated, we will need to increase the Atorvastatin to 40 mg nightly.    Get your flu shot and the new COVID booster at your local pharmacy in October.

## 2024-09-14 DIAGNOSIS — E11.9 TYPE 2 DIABETES MELLITUS WITHOUT COMPLICATION, WITHOUT LONG-TERM CURRENT USE OF INSULIN (HCC): ICD-10-CM

## 2025-03-01 DIAGNOSIS — I10 ESSENTIAL HYPERTENSION: ICD-10-CM

## 2025-03-01 DIAGNOSIS — E11.9 TYPE 2 DIABETES MELLITUS WITHOUT COMPLICATION, WITHOUT LONG-TERM CURRENT USE OF INSULIN (HCC): ICD-10-CM

## 2025-03-03 RX ORDER — GLIMEPIRIDE 2 MG/1
2 TABLET ORAL
Qty: 90 TABLET | Refills: 0 | Status: SHIPPED | OUTPATIENT
Start: 2025-03-03

## 2025-03-03 RX ORDER — LOSARTAN POTASSIUM 100 MG/1
100 TABLET ORAL DAILY
Qty: 90 TABLET | Refills: 0 | Status: SHIPPED | OUTPATIENT
Start: 2025-03-03

## 2025-03-03 NOTE — TELEPHONE ENCOUNTER
Requested Prescriptions     Pending Prescriptions Disp Refills    GLIMEPIRIDE 2 MG Oral Tab [Pharmacy Med Name: Glimepiride 2 Mg Tab ] 90 tablet 0     Sig: Take 1 tablet (2 mg total) by mouth daily with breakfast.     Signed Prescriptions Disp Refills    LOSARTAN 100 MG Oral Tab 90 tablet 0     Sig: Take 1 tablet (100 mg total) by mouth daily.     Authorizing Provider: YOLI DE     Ordering User: OSMANI EDDY       Last Refill: 8/22/24    Last OV: 8/22/24

## 2025-03-13 DIAGNOSIS — E11.9 TYPE 2 DIABETES MELLITUS WITHOUT COMPLICATION, WITHOUT LONG-TERM CURRENT USE OF INSULIN (HCC): ICD-10-CM

## 2025-03-13 NOTE — TELEPHONE ENCOUNTER
Please schedule the patient for a follow up appointment on his DM and HTN, He was given a 30 day supply of the metformin. No more refills until seen.

## 2025-03-13 NOTE — TELEPHONE ENCOUNTER
Requested Prescriptions     Pending Prescriptions Disp Refills    METFORMIN HCL 1000 MG Oral Tab [Pharmacy Med Name: Metformin Hydrochloride 1,000 Mg Tab [place]] 180 tablet 0     Sig: Take 1 tablet (1,000 mg total) by mouth 2 (two) times daily with meals.       Last Refill: 9/14/24    Last OV: 8/22/2

## 2025-03-13 NOTE — TELEPHONE ENCOUNTER
Unable to leave a voicemail as the patient's mailbox was full. A message was sent via [employer] informing the patient that an appointment is needed for future refills.

## 2025-04-10 DIAGNOSIS — E11.9 TYPE 2 DIABETES MELLITUS WITHOUT COMPLICATION, WITHOUT LONG-TERM CURRENT USE OF INSULIN (HCC): ICD-10-CM

## 2025-04-21 DIAGNOSIS — E11.9 TYPE 2 DIABETES MELLITUS WITHOUT COMPLICATION, WITHOUT LONG-TERM CURRENT USE OF INSULIN (HCC): ICD-10-CM

## 2025-04-22 DIAGNOSIS — E11.9 TYPE 2 DIABETES MELLITUS WITHOUT COMPLICATION, WITHOUT LONG-TERM CURRENT USE OF INSULIN (HCC): ICD-10-CM

## 2025-04-22 NOTE — TELEPHONE ENCOUNTER
Please schedule the patient for an OV. He needs recheck on his BP and DM before his annual physical. No more refills until seen.

## 2025-04-22 NOTE — TELEPHONE ENCOUNTER
Requested Prescriptions     Pending Prescriptions Disp Refills    metFORMIN HCl 1000 MG Oral Tab 60 tablet 0     Sig: Take 1 tablet (1,000 mg total) by mouth 2 (two) times daily with meals.       Last Refill: 3/13    Last OV: 8/22/24    Next OV: annual due 8/2025

## 2025-05-14 NOTE — PROGRESS NOTES
YASMIN LOMAS  2025 - 2025  Patient ID: [id number]  : 1990  Age: 35 years  [address]  [facility] [place] [employer]  [address] [facility], [address]  Compliance Report  Usage 2025 - 2025  Usage days 29/31 days (94%)  >= 4 hours 28 days (90%)  < 4 hours 1 days (3%)  Usage hours 213 hours 21 minutes  Average usage (total days) 6 hours 53 minutes  Average usage (days used) 7 hours 21 minutes  Median usage (days used) 7 hours 36 minutes  Total used hours (value since last reset - 2025) 3,226 hours  [employer] 10 [employer]  Serial number [id number]  Mode VAuto  Max IPAP 24 cmH2O  Min EPAP 20 cmH2O  Pressure Support 4 cmH2O  Therapy  Leaks - L/min Median: 0.4 95th percentile: 4.9 Maximum: 26.0  Events per hour AI: 0.4 HI: 0.1 AHI: 0.5  Apnea Index Central: 0.0 Obstructive: 0.1 Unknown: 0.2

## 2025-05-15 ENCOUNTER — OFFICE VISIT (OUTPATIENT)
Facility: CLINIC | Age: 35
End: 2025-05-15
Payer: COMMERCIAL

## 2025-05-15 VITALS
HEIGHT: 68 IN | HEART RATE: 88 BPM | RESPIRATION RATE: 18 BRPM | OXYGEN SATURATION: 96 % | SYSTOLIC BLOOD PRESSURE: 136 MMHG | BODY MASS INDEX: 40.77 KG/M2 | WEIGHT: 269 LBS | DIASTOLIC BLOOD PRESSURE: 80 MMHG

## 2025-05-15 DIAGNOSIS — G47.33 OSA (OBSTRUCTIVE SLEEP APNEA): Primary | ICD-10-CM

## 2025-05-15 PROCEDURE — 99213 OFFICE O/P EST LOW 20 MIN: CPT | Performed by: PHYSICIAN ASSISTANT

## 2025-05-15 NOTE — PROGRESS NOTES
[facility] PULMONARY  SLEEP PROGRESS NOTE        YASMIN LOMAS is a 35 year old male who presents today for 6 month follow up      Chief Complaint   Patient presents with    Obstructive Sleep Apnea (JAIRO)     Pt agreed to having student present during visit, sleep questioner 6/24          The following individual(s) verbally consented to be recorded using ambient AI listening technology and understand that they can each withdraw their consent to this listening technology at any point by asking the clinician to turn off or pause the recording:    Patient name: Yasmin Lomas  Additional names:  NA      History of Present Illness  Valdez Lomas is a 35 year old male with sleep apnea who presents for a follow-up regarding CPAP equipment and sleep quality.    He has concerns about the shipment and replacement schedule of his CPAP equipment. Supplies are expected every three months, contingent on regular visits. He has used the same equipment for a year and cleans it regularly.    His CPAP mask is generally comfortable but sometimes pushes against his eyes due to his facial structure, causing tearing and noise. This occurs nightly, every four to five hours, but he adjusts the mask and continues sleeping.    He experiences bloating with CPAP use, especially when lying on his left side, leading to frequent burping. Lying on his back alleviates this, though he prefers side sleeping.    He previously had severe sleep apnea with loud snoring, apnea episodes, and daytime sleepiness, which improved with CPAP therapy. His sleep schedule varies, going to bed at 10 PM on days off and midnight on workdays, waking at 7 AM. He wakes two to three times a night due to his children but falls asleep quickly. He does not nap during the day and consumes one cup of tea daily, with occasional Coca Cola and coffee.     In bed 10p-12a  Out of bed 7a  SL quick   Nighttime awakenings 2-3x - has a 3 month old   Naps none  Caffeine 1 cup tea daily, occ  coke, rare coffee     Denies teeth grinding, leg cramps, restless legs, headaches, tinnitus, chest pain, thoracic back pain, bloating, drowsy driving, sleep walking, sleep talking    DME company: [employer]  Mask type: FFYASMIN SHAW  2025 - 2025  Patient ID: [id number]  : 1990  Age: 35 years  [address]   [place]  [address] [facility], [address]  Compliance Report  Usage 2025 - 2025  Usage days 29/31 days (94%)  >= 4 hours 28 days (90%)  < 4 hours 1 days (3%)  Usage hours 213 hours 21 minutes  Average usage (total days) 6 hours 53 minutes  Average usage (days used) 7 hours 21 minutes  Median usage (days used) 7 hours 36 minutes  Total used hours (value since last reset - 2025) 3,226 hours  AirCurve 10 VAuto  Serial number [id number]  Mode VAuto  Max IPAP 24 cmH2O  Min EPAP 20 cmH2O  Pressure Support 4 cmH2O  Therapy  Leaks - L/min Median: 0.4 95th percentile: 4.9 Maximum: 26.0  Events per hour AI: 0.4 HI: 0.1 AHI: 0.5  Apnea Index Central: 0.0 Obstructive: 0.1 Unknown: 0.2       This is a 35 year old male who presents with the following symptoms, risk factors, behaviors or other items associated with sleep problems.    Sleep Apnea:   (Patient-Rptd) mouth breathing  Insomnia:  (Patient-Rptd) no symptoms of Insomnia  Restless Leg:  (Patient-Rptd) no symptoms or restless legs  Parasomnias:   (Patient-Rptd) no symptoms of parasomnias  Daytime Problems:  (Patient-Rptd) no symptoms of daytime problems    The patient's [place] Sleepiness score is (Patient-Rptd) .      Patient: Sleep review of systems today: see form.      2023 Split Night   291#  Respiratory Analysis: During the baseline portion of the study, respiratory monitoring revealed an Apnea-Hypopnea Index (AHI) of 172.9, with an overall Respiratory Disturbance Index (RDI) of 172.9 events per hour. The REM AHI was -. The supine AHI was 170.6 events per hour. The  non-supine related AHI was 174.7 events per hour. The Central Apnea Index was 0. The oxygen janice was 63.0% and the patient spent 78.1% of sleep time with oxygen levels below 88%. Supplemental oxygen was not used during the study.   During the CPAP portion of the study, respiratory monitoring revealed incomplete resolution of obstructive events with CPAP so BiPAP was initiated and at BiPAP of 24/20 cwp cm H2O there was significant yet incomplete resolution of AHI. Supine REM was not observed at this pressure.       Pt  PCP:  Italia Anderson DO  No referring provider defined for this encounter.           No data to display                  Past Medical History[1]  Past Surgical History[2]  Social History:  Social History     Social History Narrative    Not on file     Short Social Hx on File[3]  Family History:  Family History[4]  Allergies:  Allergies[5]  Current Meds:  Current Medications[6]   Counseling given: Not Answered         Problem List:  Problem List[7]    REVIEW OF SYSTEMS:   Review of Systems  See HPI     EXAM:   /80 (BP Location: Right arm, Patient Position: Sitting, Cuff Size: adult)   Pulse 88   Resp 18   Ht 5' 8\" (1.727 m)   Wt 269 lb (122 kg)   SpO2 96%   BMI 40.90 kg/m²  Estimated body mass index is 40.9 kg/m² as calculated from the following:    Height as of this encounter: 5' 8\" (1.727 m).    Weight as of this encounter: 269 lb (122 kg).   Neck in inches:      Wt Readings from Last 6 Encounters:   05/15/25 269 lb (122 kg)   08/22/24 274 lb (124.3 kg)   05/09/24 (!) 302 lb (137 kg)   02/27/24 300 lb (136.1 kg)   02/02/24 (!) 301 lb (136.5 kg)   01/18/24 298 lb (135.2 kg)     BP Readings from Last 3 Encounters:   05/15/25 136/80   08/22/24 132/84   05/09/24 136/90     Pulse Readings from Last 3 Encounters:   05/15/25 88   08/22/24 112   05/09/24 88     SpO2 Readings from Last 3 Encounters:   05/15/25 96%   08/22/24 98%   05/09/24 97%      Patient weight not recorded    Vital signs  reviewed.  Physical Exam  Vitals and nursing note reviewed.   Constitutional:       Appearance: Normal appearance. He is obese.   HENT:      Head: Normocephalic and atraumatic.      Right Ear: External ear normal.      Left Ear: External ear normal.   Pulmonary:      Effort: Pulmonary effort is normal. No respiratory distress.   Musculoskeletal:      Cervical back: Normal range of motion and neck supple.   Neurological:      General: No focal deficit present.      Mental Status: He is alert and oriented to person, place, and time.   Psychiatric:         Attention and Perception: Attention and perception normal.         Mood and Affect: Mood and affect normal.         Speech: Speech normal.         Behavior: Behavior normal. Behavior is cooperative.         Thought Content: Thought content normal.         Cognition and Memory: Cognition and memory normal.         Judgment: Judgment normal.        Assessment & Plan  Obstructive Sleep Apnea - very severe .9   Managed with BiPAP therapy. Bloating and burping due to aerophagia. Symptoms improved with BiPAP, reducing daytime sleepiness and snoring. Current settings 24/20, plan to adjust to 24/16 for auto-adjustment and reduced bloating. Weight loss has reduced severity of apnea.  - Adjust CPAP settings to 24/16 PS 4 for auto-adjustment.  - Order new CPAP supplies with automatic refills.  - Provide schedule for cleaning and replacing CPAP components.  - Schedule follow-up in one year to recertify supplies.    Patient is using and benefiting from regular cpap use.  Patient was instructed to clean equipment on a weekly basis.  Patient was instructed to keep up to date with supplies.  Patient was informed to avoid drowsy driving, or using heavy machinery.  Patient was instructed to followup on a annual basis.      Patient Instructions   CPAP/BiPAP Instructions:    Please be advised:   Do not drive while sleepy   Take CPAP/BiPAP machine to any procedure that requires  sedation     When should I clean my machine & supplies?   Clean mask cushions or nasal pillow, headgear, tubing, and humidifier chamber with mild soap (Tari) and water   If water chamber has hard water buildup (white crust), soak in warm water & vinegar mix 50/50.   Rinse and hang dry     DAILY   Wipe mask cushions or nasal pillow   Empty & rinse water chamber- refill with distilled water     WEEKLY   Clean mask cushions or nasal pillow, headgear, tubing, and humidifier chamber with mild soap (Tari) and water   If water chamber has hard water buildup (white crust), soak in warm water & vinegar mix 50/50.   Rinse and hang dry     When should supplies be replaced?   Contact your home care company for replacement supplies, or if your machine is malfunctioning   *Below is a general guideline of what we recommend. Replacement of supplies differs depending on your insurance company*   MONTHLY: Replace filter and mask cushion   6 MONTHS: Replace headgear and tubing     Travel Tips   Keep CPAP/BiPAP in original bag when traveling, and place luggage tag on bag   Most airlines consider CPAP/BiPAP to be a medical device, therefore it is a free carry-on item   If unable to get distilled water, bottled water is safe while traveling. DO NOT use tap water   When traveling outside the [place] only a power adapter is necessary (CPAP can operate without a converter), bring an extension cord   Consider purchasing or renting a travel CPAP (not covered by insurance)     Dry Mouth/Nose   Turn up the humidity on your machine (select \"Options\" from the home screen)   Place a cool mist humidifier at your bedside   Over-the-counter remedies: Biotene, XyliMelts, NasoGel     Air Leak   Try adjusting your mask/headgear while laying in sleeping position vs. sitting up   Wash and dry your face prior to putting your mask on   If applicable: shave facial hair at night (or before wearing CPAP)   Purchase \"RemZzzs\" (through [employer], or  [email or web address])   100% cotton knit barrier that goes between your mask cushion and your skin   Replace your mask cushion (at least once per month) and/or headgear (every 3-6 months)     Nasal Congestion   CPAP therapy can cause nasal passages to dry out, & mucous membranes try to protect the nasal passages by producing excess mucous, so congestion results.   Over-the counter remedies: Flonase, Nasacort, Sinus Rinses (Neti-Pot), DO NOT USE Afrin   Try a mask that goes over the nose and mouth     Skin Irritation   Clean supplies regularly (Citrus II Mask Wipes, Control III disinfectant solution)   Try over the counter creams such as hydrocortisone 1% (apply in the morning after showering)   Your headgear may be too tight, replace supplies so you don't need to adjust so tightly   Try RemZzzs (100% cotton knit barrier that goes between your mask cushion and your skin)     Gas/Bloating   Try a different sleeping position to keep air out of the stomach. Lay on the left side or rotate to the right side. Incline with pillows or lay flat.   Over-the-counter remedies: Simethicone            Independent interpretation of Sleep Download as defined above.  Continue with Rx management of Sleep apnea with PAP therapy.    COMPLIANCE is required by insurance for 4 hours a night most nights of the week.    Advised if still with sleep apnea and not using CPAP has a 7 fold increase in risk of heart attack, stroke, abnormal heart rhythm  and death,  increased risk of driving accidents.     Advised to refrain from driving when sleepy.      Recommend weight loss, and maintain and optimal BMI with Exercise 30 minutes most days to target heart rate .     Advised patient to change filters,masks,hoses  and tubes and equiptment on a  regular schedule.    Filters and seals shall be changed every 1 month,  Hoses every 3 months,   CPAP mask and humidifier chamber changed every 6 month  with the durable medical equipment provider.         Meds &  Refills for this Visit:  Requested Prescriptions      No prescriptions requested or ordered in this encounter       Outcome: Parent verbalizes understanding. Parent is notified to call with any questions, complications, allergies, or worsening or changing symptoms.  Parent is to call with any side effects or complications from the treatments as a result of today.     \" This note was created utilizing Dragon speech recognition software.  Please excuse any grammatical errors. Call my office if you have any questions regarding this note. \"     Jessica Schuler PA-C           [1]   Past Medical History:   Attention deficit hyperactivity disorder (ADHD)    Essential hypertension    Low testosterone in male    Mixed hyperlipidemia due to type 2 diabetes mellitus (HCC)    Obesity, morbid (HCC)    JAIRO (obstructive sleep apnea)    Type 2 diabetes mellitus without complication, without long-term current use of insulin (HCC)   [2] History reviewed. No pertinent surgical history.  [3]   Social History  Socioeconomic History    Marital status:    Occupational History    Occupation: [employer]     Employer: [employer]    Tobacco Use    Smoking status: Never     Passive exposure: Never    Smokeless tobacco: Never   Vaping Use    Vaping status: Never Used   Substance and Sexual Activity    Alcohol use: Yes     Comment: rarely    Drug use: Never   Other Topics Concern    Caffeine Concern No    Exercise No    Seat Belt Yes    Special Diet No    Stress Concern No    Weight Concern No     Service No    Blood Transfusions No    Occupational Exposure No    Hobby Hazards No    Sleep Concern No    Back Care No    Bike Helmet No    Self-Exams No     Social Drivers of Health      Received from [facility]   [4]   Family History  Problem Relation Age of Onset    Diabetes Father     Heart Disorder Father    [5]   Allergies  Allergen Reactions    Lisinopril OTHER (SEE COMMENTS)     Hives and lip swelling     [6]   Current  Outpatient Medications   Medication Sig Dispense Refill    metFORMIN HCl 1000 MG Oral Tab Take 1 tablet (1,000 mg total) by mouth 2 (two) times daily with meals. 60 tablet 0    LOSARTAN 100 MG Oral Tab Take 1 tablet (100 mg total) by mouth daily. 90 tablet 0    GLIMEPIRIDE 2 MG Oral Tab Take 1 tablet (2 mg total) by mouth daily with breakfast. 90 tablet 0    atorvastatin 20 MG Oral Tab Take 1 tablet (20 mg total) by mouth nightly. 90 tablet 3    Tirzepatide (MOUNJARO) 5 MG/0.5ML Subcutaneous Solution Pen-injector Inject 5 mg into the skin once a week. 6 mL 1    latanoprost 0.005 % Ophthalmic Solution Place 1 drop into both eyes nightly. 7.5 mL 0    terbinafine (LAMISIL AT) 1 % External Cream Apply 1 Application topically at bedtime. (Patient not taking: Reported on 5/9/2024) 28.4 g 3   [7]   Patient Active Problem List  Diagnosis    Obesity, morbid ([facility])    Low testosterone in male    Snoring    Essential hypertension    Abnormal LFTs    Mixed hyperlipidemia due to type 2 diabetes mellitus ([facility])    Leukocytosis    Alcoholic ([facility])    Positive RPR test    Attention deficit hyperactivity disorder (ADHD)    JAIRO (obstructive sleep apnea)- very severe .9    Type 2 diabetes mellitus without complication, without long-term current use of insulin ([facility])

## 2025-05-15 NOTE — PATIENT INSTRUCTIONS
CPAP/BiPAP Instructions:    Please be advised:   Do not drive while sleepy   Take CPAP/BiPAP machine to any procedure that requires sedation     When should I clean my machine & supplies?   Clean mask cushions or nasal pillow, headgear, tubing, and humidifier chamber with mild soap (Tari) and water   If water chamber has hard water buildup (white crust), soak in warm water & vinegar mix 50/50.   Rinse and hang dry     DAILY   Wipe mask cushions or nasal pillow   Empty & rinse water chamber- refill with distilled water     WEEKLY   Clean mask cushions or nasal pillow, headgear, tubing, and humidifier chamber with mild soap (Tari) and water   If water chamber has hard water buildup (white crust), soak in warm water & vinegar mix 50/50.   Rinse and hang dry     When should supplies be replaced?   Contact your home care company for replacement supplies, or if your machine is malfunctioning   *Below is a general guideline of what we recommend. Replacement of supplies differs depending on your insurance company*   MONTHLY: Replace filter and mask cushion   6 MONTHS: Replace headgear and tubing     Travel Tips   Keep CPAP/BiPAP in original bag when traveling, and place luggage tag on bag   Most airlines consider CPAP/BiPAP to be a medical device, therefore it is a free carry-on item   If unable to get distilled water, bottled water is safe while traveling. DO NOT use tap water   When traveling outside the [place] only a power adapter is necessary (CPAP can operate without a converter), bring an extension cord   Consider purchasing or renting a travel CPAP (not covered by insurance)     Dry Mouth/Nose   Turn up the humidity on your machine (select \"Options\" from the home screen)   Place a cool mist humidifier at your bedside   Over-the-counter remedies: Biotene, XyliMelts, NasoGel     Air Leak   Try adjusting your mask/headgear while laying in sleeping position vs. sitting up   Wash and dry your face prior to putting your  mask on   If applicable: shave facial hair at night (or before wearing CPAP)   Purchase \"RemZzzs\" (through home care co., [email or web address], or [email or web address])   100% cotton knit barrier that goes between your mask cushion and your skin   Replace your mask cushion (at least once per month) and/or headgear (every 3-6 months)     Nasal Congestion   CPAP therapy can cause nasal passages to dry out, & mucous membranes try to protect the nasal passages by producing excess mucous, so congestion results.   Over-the counter remedies: Flonase, Nasacort, Sinus Rinses (Neti-Pot), DO NOT USE Afrin   Try a mask that goes over the nose and mouth     Skin Irritation   Clean supplies regularly (Citrus II Mask Wipes, Control III disinfectant solution)   Try over the counter creams such as hydrocortisone 1% (apply in the morning after showering)   Your headgear may be too tight, replace supplies so you don't need to adjust so tightly   Try RemZzzs (100% cotton knit barrier that goes between your mask cushion and your skin)     Gas/Bloating   Try a different sleeping position to keep air out of the stomach. Lay on the left side or rotate to the right side. Incline with pillows or lay flat.   Over-the-counter remedies: Simethicone

## 2025-05-20 DIAGNOSIS — E11.9 TYPE 2 DIABETES MELLITUS WITHOUT COMPLICATION, WITHOUT LONG-TERM CURRENT USE OF INSULIN (HCC): ICD-10-CM

## 2025-05-26 DIAGNOSIS — E11.9 TYPE 2 DIABETES MELLITUS WITHOUT COMPLICATION, WITHOUT LONG-TERM CURRENT USE OF INSULIN (HCC): ICD-10-CM

## 2025-05-27 ENCOUNTER — TELEPHONE (OUTPATIENT)
Dept: FAMILY MEDICINE CLINIC | Facility: CLINIC | Age: 35
End: 2025-05-27

## 2025-05-27 NOTE — TELEPHONE ENCOUNTER
Left voicemail to tell Valdez that he is due for a physical with Dr. Italia Anderson after August 22nd. I gave him our office number to call back if he would like help scheduling this appointment.

## 2025-06-11 ENCOUNTER — TELEPHONE (OUTPATIENT)
Facility: CLINIC | Age: 35
End: 2025-06-11

## 2025-06-11 NOTE — TELEPHONE ENCOUNTER
Usage 05/15/2025 - 06/11/2025  Usage days 27/28 days (96%)  >= 4 hours 26 days (93%)  < 4 hours 1 days (4%)  Usage hours 190 hours 34 minutes  Average usage (total days) 6 hours 48 minutes  Average usage (days used) 7 hours 3 minutes  Median usage (days used) 7 hours 0 minutes  Total used hours (value since last reset - 06/11/2025) 3,423 hours  AirCurve 10 VAuto  Serial number [id number]  Mode VAuto  Max IPAP 24 cmH2O  Min EPAP 16 cmH2O  Pressure Support 4 cmH2O  Therapy  Leaks - L/min Median: 0.0 95th percentile: 4.3 Maximum: 8.8  Events per hour AI: 0.2 HI: 0.1 AHI: 0.3  Apnea Index Central: 0.0 Obstructive: 0.2 Unknown: 0.0

## 2025-06-13 DIAGNOSIS — I10 ESSENTIAL HYPERTENSION: ICD-10-CM

## 2025-06-13 DIAGNOSIS — E11.9 TYPE 2 DIABETES MELLITUS WITHOUT COMPLICATION, WITHOUT LONG-TERM CURRENT USE OF INSULIN (HCC): ICD-10-CM

## 2025-06-13 RX ORDER — LOSARTAN POTASSIUM 100 MG/1
100 TABLET ORAL DAILY
Qty: 90 TABLET | Refills: 0 | Status: SHIPPED | OUTPATIENT
Start: 2025-06-13

## 2025-06-13 RX ORDER — GLIMEPIRIDE 2 MG/1
2 TABLET ORAL
Qty: 90 TABLET | Refills: 0 | Status: SHIPPED | OUTPATIENT
Start: 2025-06-13

## 2025-06-13 NOTE — TELEPHONE ENCOUNTER
Left voicemail/sent [employer] message reminding patient that they are due for physical with Dr. Italia Anderson . Instructed the patient to return the call at [phone] or to schedule through [employer].

## 2025-06-13 NOTE — TELEPHONE ENCOUNTER
Requested Prescriptions     Pending Prescriptions Disp Refills    LOSARTAN 100 MG Oral Tab [Pharmacy Med Name: Losartan Potassium 100 Mg Tab Camb] 90 tablet 0     Sig: Take 1 tablet (100 mg total) by mouth daily.    GLIMEPIRIDE 2 MG Oral Tab [Pharmacy Med Name: Glimepiride 2 Mg Tab Jayne] 90 tablet 0     Sig: TAKE 1 TABLET (2 MG TOTAL) BY MOUTH DAILY WITH BREAKFAST.       Last Refill: 3/3    Last OV: 8/22/24    Next OV: patient overdue for HTN follow up.

## 2025-06-27 DIAGNOSIS — E11.9 TYPE 2 DIABETES MELLITUS WITHOUT COMPLICATION, WITHOUT LONG-TERM CURRENT USE OF INSULIN (HCC): ICD-10-CM

## 2025-06-27 NOTE — TELEPHONE ENCOUNTER
Requested Prescriptions     Pending Prescriptions Disp Refills    metFORMIN HCl 1000 MG Oral Tab 60 tablet 0     Sig: Take 1 tablet (1,000 mg total) by mouth 2 (two) times daily with meals.       Last Refill: 5/27/25    Last OV: 8/22/24

## 2025-07-29 DIAGNOSIS — E11.9 TYPE 2 DIABETES MELLITUS WITHOUT COMPLICATION, WITHOUT LONG-TERM CURRENT USE OF INSULIN (HCC): ICD-10-CM

## 2025-08-21 DIAGNOSIS — E11.69 MIXED HYPERLIPIDEMIA DUE TO TYPE 2 DIABETES MELLITUS (HCC): ICD-10-CM

## 2025-08-21 DIAGNOSIS — E11.9 TYPE 2 DIABETES MELLITUS WITHOUT COMPLICATION, WITHOUT LONG-TERM CURRENT USE OF INSULIN (HCC): ICD-10-CM

## 2025-08-21 DIAGNOSIS — E78.2 MIXED HYPERLIPIDEMIA DUE TO TYPE 2 DIABETES MELLITUS (HCC): ICD-10-CM

## 2025-08-21 RX ORDER — ATORVASTATIN CALCIUM 20 MG/1
20 TABLET, FILM COATED ORAL NIGHTLY
Qty: 30 TABLET | Refills: 0 | Status: SHIPPED | OUTPATIENT
Start: 2025-08-21

## (undated) NOTE — Clinical Note
· Initiate  Bilevel at 24/20 cm H2O,  with a [employer] Simplus full face mask, size Medium.  · Then Obtain Download data for compliance and efficacy from biPAP machine in 30 days

## (undated) NOTE — LETTER
06/13/25    [facility] [place] [address]      June 13, 2025      Dear [facility] ,    Our records indicate you are now due for your Annual Wellness Exam. Your last Wellness Exam was on 8/22/2024 with Dr. Italia Anderson.       Your health is important to us; please schedule this visit at your earliest convenience.     You may schedule directly through "[employer]", our [facility] website ([email or web address]) or by phone at [phone].        Sincerely,    [facility]  [address]  [place] [address] [phone]